# Patient Record
Sex: MALE | Race: WHITE | Employment: OTHER | URBAN - METROPOLITAN AREA
[De-identification: names, ages, dates, MRNs, and addresses within clinical notes are randomized per-mention and may not be internally consistent; named-entity substitution may affect disease eponyms.]

---

## 2019-04-16 ENCOUNTER — HOSPITAL ENCOUNTER (OUTPATIENT)
Dept: WOUND CARE | Age: 79
Discharge: HOME | End: 2019-04-16
Payer: MEDICARE

## 2019-04-16 VITALS
DIASTOLIC BLOOD PRESSURE: 60 MMHG | OXYGEN SATURATION: 99 % | TEMPERATURE: 98 F | HEIGHT: 68 IN | WEIGHT: 186 LBS | HEART RATE: 73 BPM | BODY MASS INDEX: 28.19 KG/M2 | RESPIRATION RATE: 16 BRPM | SYSTOLIC BLOOD PRESSURE: 98 MMHG

## 2019-04-16 PROCEDURE — 97597 DBRDMT OPN WND 1ST 20 CM/<: CPT

## 2019-04-16 PROCEDURE — 88305 TISSUE EXAM BY PATHOLOGIST: CPT

## 2019-04-16 PROCEDURE — 99214 OFFICE O/P EST MOD 30 MIN: CPT

## 2019-04-16 NOTE — WOUND CARE
04/16/19 1635 Wound Leg Lower Left;Medial  
Date First Assessed/Time First Assessed: 04/16/19 1633   POA: Yes  Wound Type: Venous  Location: Leg Lower  Orientation: Left;Medial  
Dressing Status  Clean, dry, and intact Dressing Type   
(mitchel, gauze) Non-Pressure Injury Full thickness (subcut/muscle) Wound Length (cm) 0.8 cm Wound Width (cm) 0.7 cm Wound Depth (cm) 0.8 Wound Surface area (cm^2) 0.56 cm^2 Condition of [facility] Condition of Edges Open Tissue Type Percent Maroon/Purple 10 % Tissue Type Percent Yellow 90 Drainage Amount  Scant Drainage Color Serosanguinous Wound Odor None Periwound Skin Condition Erythema, blanchable Cleansing and Cleansing Agents (foam wash, vashe and barrier wipes) Dressing Type Applied Unna boot 
(mesalt, gauze, exudry, mitchel) Wound Procedure Type Devitalized Tissues Procedure Time Out 4572 Consent Obtained  Yes Procedure Bleeding Minimal  
Procedure Hemostasis  Pressure Type of Tissue Removed  Devitalized Procedure Instrument  Curette Procedure Pain Scale Numeric 2/10 Debridement Procedure Performed by  
(Dr. Chao Chopra) Post-Procedure Length (cm) 0.8 cm Post-Procedure Width (cm) 0.7 cm Post-Procedure Depth (cm) 1.1 cm Post-Procedure Volume (cm^3) 0.62 cm^3 Post-Procedure Surface Area (cm^2) 0.56 cm^2 Assisted Physcian in Procedure  Yes Procedure Tolerated Well Post debridement picture on top

## 2019-04-23 ENCOUNTER — HOSPITAL ENCOUNTER (OUTPATIENT)
Dept: WOUND CARE | Age: 79
Discharge: HOME | End: 2019-04-23
Payer: MEDICARE

## 2019-04-23 VITALS
HEART RATE: 80 BPM | TEMPERATURE: 97.3 F | RESPIRATION RATE: 17 BRPM | DIASTOLIC BLOOD PRESSURE: 60 MMHG | SYSTOLIC BLOOD PRESSURE: 94 MMHG | OXYGEN SATURATION: 100 %

## 2019-04-23 PROCEDURE — 11042 DBRDMT SUBQ TIS 1ST 20SQCM/<: CPT

## 2019-04-23 NOTE — WOUND CARE
Wound Care Progress Note Subjective:  
Quinton Ordonez is a 77 y.o.  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 No concerns. Wound care going well. Offloading wound:  
Appetite:  
Wound associated pain: mild Diabetic:  
 
Smoker: 
 
ROS: no N/V, no T/chills; no local rash There have been no changes in patient's medical history in the interim. Objective:  
Physical Exam:  
 VSS, Afebrile General: well developed, well nourished, pleasant , NAD. Hygiene good Psych: cooperative. Pleasant. No anxiety or depression. Normal mood and affect. Neuro: alert and oriented to person/place/situation. Lower extremities: color normal; temperature normal. Hair growth is not present. Calves are supple, nontender, approximately equally sized in comparison. Focused Lower Extremity Exam 
Vascular exam: 
 
Left lower extremity: Pitting  +1  edema, foot ,  
DP pulse :0 
PT pulse: 0 Nails  
 
 lower extremity:   edema, foot ,  
DP pulse :  
PT pulse:   
Nails Calf Ulcer 1 Description:  
Etiology:Venous Measurement: 0.8 x 0.5 x 0.5 cm Ulcer bed: improved with increased granulation and reduced necrotic bed Depth of Wound: To Muscle Periwound: Indurated Exudate: Serosanguinous Assessment/Plan  
77 y.o. male with Venous  Calf ulcer,biposy reviewed showed fibrotic tissue with ulceration no other path found curettage debridement Improving and reduced in size Clean wound with Vashe flush after curette debridement performed to remove the fibrotic and necrotic base to good hemorraghic bed Wound dressing ordered Unna boot applied Periwound:  
 
Compression ordered:  
Prescriptions ordered: Pre Authorization for [employer] Labs ordered:  
Vascular studies:  
Tissue Culture and Sensitivity:  
Radiology test ordered:  
Consultations:  
 
Home Health ordered: How often: Once a week Patient understood and agrees with plan. Questions answered. Weekly visits and serial debridements also discussed. Follow up with me in 2 week. Signed By: Kandi Coffman DPM   
 April 23, 2019

## 2019-04-23 NOTE — WOUND CARE
04/23/19 7582 Wound Leg Lower Left;Medial  
Date First Assessed/Time First Assessed: 04/16/19 1633   POA: Yes  Wound Type: Venous  Location: Leg Lower  Orientation: Left;Medial  
Wound Length (cm) 0.8 cm Wound Width (cm) 0.5 cm Wound Depth (cm) 0.5 Wound Surface area (cm^2) 0.4 cm^2 Change in Wound Size % 28.57 Condition of Base Granulation Condition of Edges Open Tissue Type Percent Pink (10) Tissue Type Percent Red 90 Drainage Amount  Scant Drainage Color Serosanguinous Wound Odor None Periwound Skin Condition Erythema, blanchable Cleansing and Cleansing Agents  Other (comment) (Vashe wound cleanser.) Dressing Type Applied Unna boot; Other (Comment) Wound Procedure Type Devitalized Tissues Procedure Time Out 3884 Consent Obtained  Yes Procedure Bleeding Minimal  
Procedure Hemostasis  Pressure Type of Tissue Removed  Devitalized Procedure Instrument  Curette Debridement piccs

## 2019-04-23 NOTE — DISCHARGE INSTRUCTIONS
Physicians Order for Wound Care                                      Discharge Instructions                   These are the instructions to follow for care of your wound and any follow- up appointments. Please call the [address] if you have any questions or if you experience any of the following :        · Increase in pain                                     · Temperature over 101 degrees Fahrenheit    ·  Increase in drainage            · Drainage with a foul odor    ·  Bleeding    ·  Increase in Swelling    · Compression bandage changes ( slippage, breakthrough drainage)           [facility] business hours are 8:00 am - 4:30 pm. Please contact your primary care physician or proceed to the nearest health care facility if you experience any of the above concerns after business hours.      Offloading-   Edema Control-Elevate legs above level of heart Unna boots   Nutritional supplements-    Home Care- keep the bandages clean dry   Consult-   Home Care-   Apply to Wound-   Cover with-   Other Details- Other Details continue taking the antibiotc     Erin Ulcer care-   Dressing Change frequency-    Prescription(s) given   Follow up in 2 weeks with Dr. Domenico Quintero, Nurse Visit in 6 days for dressing change    Physician Name Dr. Graciela Oh _________________________ 4/23/2019    Yasir Cooney DPM

## 2019-04-29 ENCOUNTER — HOSPITAL ENCOUNTER (OUTPATIENT)
Dept: WOUND CARE | Age: 79
Discharge: HOME | End: 2019-04-29
Payer: MEDICARE

## 2019-04-29 VITALS
SYSTOLIC BLOOD PRESSURE: 97 MMHG | RESPIRATION RATE: 16 BRPM | HEART RATE: 84 BPM | OXYGEN SATURATION: 100 % | TEMPERATURE: 98 F | DIASTOLIC BLOOD PRESSURE: 62 MMHG

## 2019-04-29 PROCEDURE — 29580 STRAPPING UNNA BOOT: CPT

## 2019-04-29 NOTE — DISCHARGE INSTRUCTIONS
Leave dressings in place until next visit    Patient to return for wound care in: 7 Days    PLEASE CONTACT OFFICE AS SOON AS POSSIBLE IF UNABLE TO MAKE THIS APPOINTMENT. Inspect your wounds, looking for signs of infection which may include the following:  Increase in redness  Red \"streaks\" from wound  Increase in swelling Fever  Unusual odor Change in the amount of wound drainage. Should you experience any significant changes in your wound(s) or have any questions regarding your home care instructions please contact the wound center or your home health company. If after regular business hours, please call your family doctor or local emergency room. Edema Control: Elevate legs as much as possible. Avoid standing in one position for more than 10 minutes. Avoid setting with legs down. Do not cross legs while sitting. Off-Loading:Frequent position changes. Do not cross legs while sitting. Shift weight every 20 minutes or more when sitting for prolonged periods of time.

## 2019-04-29 NOTE — WOUND CARE
04/29/19 1043 Wound Leg Lower Left;Medial  
Date First Assessed/Time First Assessed: 04/16/19 1633   POA: Yes  Wound Type: Venous  Location: Leg Lower  Orientation: Left;Medial  
Dressing Status  Clean, dry, and intact Dressing Type  Unna boot 
(mitchel, exudry, 4x4, mepilex foam) Non-Pressure Injury Full thickness (subcut/muscle) Wound Length (cm) 0.5 cm Wound Width (cm) 0.3 cm Wound Depth (cm) 0.2 Wound Surface area (cm^2) 0.15 cm^2 Change in Wound Size % 73.21 Condition of Base Granulation;Slough Condition of Edges Open Tissue Type Percent Red 75 Tissue Type Percent Yellow 25 Drainage Amount  Small Drainage Color Serosanguinous Wound Odor None Periwound Skin Condition Macerated;Ecchymosis Cleansing and Cleansing Agents  Other (comment) (vashe) Dressing Type Applied Unna boot 
(mitchel, exudry, foam, aquacellag, carlos) Procedure Tolerated Well

## 2019-05-07 ENCOUNTER — HOSPITAL ENCOUNTER (OUTPATIENT)
Dept: WOUND CARE | Age: 79
Discharge: HOME | End: 2019-05-07
Payer: MEDICARE

## 2019-05-07 VITALS
RESPIRATION RATE: 18 BRPM | DIASTOLIC BLOOD PRESSURE: 66 MMHG | SYSTOLIC BLOOD PRESSURE: 96 MMHG | TEMPERATURE: 97.5 F | HEART RATE: 70 BPM | OXYGEN SATURATION: 100 %

## 2019-05-07 PROCEDURE — 99214 OFFICE O/P EST MOD 30 MIN: CPT

## 2019-05-07 NOTE — WOUND CARE
05/07/19 1110 Wound Leg Lower Left;Medial  
Date First Assessed/Time First Assessed: 04/16/19 1633   POA: Yes  Wound Type: Venous  Location: Leg Lower  Orientation: Left;Medial  
Dressing Status  Clean, dry, and intact Dressing Type  Unna boot 
(mitchel, exudry, mepilex foam, aquacel ag) Non-Pressure Injury Full thickness (subcut/muscle) Wound Length (cm) 0.8 cm Wound Width (cm) 0.4 cm Wound Depth (cm) 0.1 Wound Surface area (cm^2) 0.32 cm^2 Change in Wound Size % 42.86 Condition of Base Granulation;Slough Condition of Edges Open Tissue Type Percent Pink 90 Tissue Type Percent Yellow 10 Drainage Amount  Scant Drainage Color Serosanguinous Wound Odor None Periwound Skin Condition Intact Cleansing and Cleansing Agents (foam wash, vashe and barrier wipes) Dressing Type Applied (bacitracin, aquacel ag, mepilex border, tubigrip D) Procedure Tolerated Well

## 2019-05-07 NOTE — WOUND CARE
Wound Care Progress Note Subjective:  
Kiana Hess is a 77 y.o.  male for follow up of Left   Venous  ulcer to the Other part of lower leg To Fat Layer Ulcer has been present for/since Doing well. No concerns. Wound care going well. Offloading wound: yes Appetite:  
Wound associated pain: mild Diabetic:  
 
Smoker: 
 
ROS: no N/V, no T/chills; no local rash There have been no changes in patient's medical history in the interim. Objective:  
Physical Exam:  
 VSS, Afebrile General: well developed, well nourished, pleasant , NAD. Hygiene good Psych: cooperative. Pleasant. No anxiety or depression. Normal mood and affect. Neuro: alert and oriented to person/place/situation. Lower extremities: color normal; temperature normal. Hair growth is not present. Calves are supple, nontender, approximately equally sized in comparison. Focused Lower Extremity Exam 
Vascular exam: 
 
Left lower extremity: Pitting  +1  edema, foot ,  
DP pulse :0 
PT pulse: 0 Nails  
 
 lower extremity:   edema, foot ,  
DP pulse :  
PT pulse:   
Nails Other part of lower leg Ulcer 1 Description:  
Etiology:Venous Measurement: 0.8 x 0.4 x 0.1 cm Ulcer bed: Granular/Healthy Depth of Wound: To Fat Layer Periwound: Intact Exudate: Serosanguinous Assessment/Plan  
77 y.o. male with Venous  Other part of lower leg ulcer. improving Clean wound with foam wash, vashe Wound dressing ordered PetMD ag, bacitracin, mepilex, tubigrip, Periwound:  
 
Compression ordered: Compression Stockings 15-20 mmHg Prescriptions ordered:  
Labs ordered:  
Vascular studies:  
Tissue Culture and Sensitivity:  
Radiology test ordered:  
Consultations:  
 
Home Health ordered: yes How often:  
 
 
Patient understood and agrees with plan. Questions answered. Weekly visits and serial debridements also discussed. Follow up with me in 2 week. Signed By: Veronica Talamantes DPM   
 May 7, 2019

## 2019-05-14 ENCOUNTER — HOSPITAL ENCOUNTER (OUTPATIENT)
Dept: WOUND CARE | Age: 79
Discharge: HOME | End: 2019-05-14
Payer: MEDICARE

## 2019-05-14 VITALS
HEART RATE: 69 BPM | SYSTOLIC BLOOD PRESSURE: 90 MMHG | TEMPERATURE: 97.7 F | RESPIRATION RATE: 15 BRPM | DIASTOLIC BLOOD PRESSURE: 51 MMHG | OXYGEN SATURATION: 99 %

## 2019-05-14 PROCEDURE — 29580 STRAPPING UNNA BOOT: CPT

## 2019-05-21 ENCOUNTER — HOSPITAL ENCOUNTER (OUTPATIENT)
Dept: WOUND CARE | Age: 79
Discharge: HOME | End: 2019-05-21
Payer: MEDICARE

## 2019-05-21 VITALS
SYSTOLIC BLOOD PRESSURE: 90 MMHG | DIASTOLIC BLOOD PRESSURE: 52 MMHG | TEMPERATURE: 98.1 F | OXYGEN SATURATION: 96 % | HEART RATE: 65 BPM | RESPIRATION RATE: 17 BRPM

## 2019-05-21 PROCEDURE — 29580 STRAPPING UNNA BOOT: CPT

## 2019-05-21 NOTE — WOUND CARE
Ulcer Debridement Ulcer Number 1; Left Other part of lower leg To Fat Layer;  Venous Character of Ulcer: Improved Indication for Debridement: Abnormal wound edge Pre debridement measurements:  
Wound Length: 0.5 cm Wound Width :0.2 cm Wound Depth :0.1 Risks of procedure were discussed with patient. Consent has been signed. Anesthetic: None Level of Debridement: Epidermis Tissue and/or Material removed: Skin Type of Tissue: Non- Viable Pre-debridement severity: Fat Layer Exposed Post debridement severity: Fat Layer exposed Instrument(s) used: Curette Bleeding controlled with: Pressure Estimated blood loss: Minimal 
 
Post debridement measurements:  
Wound Length: 0.5 cm Wound Width :0.2 cm Wound Depth :0.1 Post procedural pain: 2 /10 Patient tolerated procedure well.

## 2019-05-23 NOTE — WOUND CARE
05/21/19 1145 Wound Leg Lower Left;Medial  
Date First Assessed/Time First Assessed: 04/16/19 1633   POA: Yes  Wound Type: Venous  Location: Leg Lower  Orientation: Left;Medial  
Dressing Status  Intact; Old drainage Dressing Type  Unna boot (mepitel one, exudry,mitchel, coban) Non-Pressure Injury Full thickness (subcut/muscle) Wound Length (cm) 0.5 cm Wound Width (cm) 0.2 cm Wound Depth (cm) 0.1 Wound Surface area (cm^2) 0.1 cm^2 Change in Wound Size % 82.14 Condition of Base Granulation;Slough Condition of Edges Open Tissue Type Percent Pink 90 Tissue Type Percent Red 10 Tissue Type Percent Yellow 10 Drainage Amount  Scant Drainage Color Serosanguinous Wound Odor None Periwound Skin Condition Intact Cleansing and Cleansing Agents  Dermal wound cleanser Dressing Type Applied Unna boot 
(ag foam, promogran, 4x4,s, mitchel, coban) Procedure Tolerated Well

## 2019-05-23 NOTE — DISCHARGE INSTRUCTIONS
For Home Care/Self Care       Keep dressing dry and intact when bathing               Leave dressings in place until next visit     Patient to return for wound care in:  1 week for nurse assessment and dressing change and 2 weeks to see Dr. Jones Clarke. Inspect your wounds, looking for signs of infection which may include the following:  Increase in redness  Red \"streaks\" from wound  Increase in swelling  Fever  Unusual odor  Change in the amount of wound drainage. Should you experience any significant changes in your wound(s) or have any questions regarding your home care instructions please contact the wound center or your home health company. If after regular business hours, please call your family doctor or local emergency room. Edema Control: Elevate legs as much as possible. Avoid standing in one position for more than 10 minutes. Avoid setting with legs down. Do not cross legs while sitting. Off-Loading:Frequent position changes. Do not cross legs while sitting. Shift weight every 20 minutes or more when sitting for prolonged periods of time.     05/21/19 4533

## 2019-05-28 ENCOUNTER — HOSPITAL ENCOUNTER (OUTPATIENT)
Dept: WOUND CARE | Age: 79
Discharge: HOME | End: 2019-05-28
Payer: MEDICARE

## 2019-05-28 VITALS
SYSTOLIC BLOOD PRESSURE: 88 MMHG | TEMPERATURE: 98.1 F | DIASTOLIC BLOOD PRESSURE: 50 MMHG | OXYGEN SATURATION: 96 % | HEART RATE: 85 BPM | RESPIRATION RATE: 18 BRPM

## 2019-05-28 PROCEDURE — 29580 STRAPPING UNNA BOOT: CPT

## 2019-05-28 NOTE — WOUND CARE
05/28/19 1524 Wound Leg Lower Left;Medial  
Date First Assessed/Time First Assessed: 04/16/19 1633   POA: Yes  Wound Type: Venous  Location: Leg Lower  Orientation: Left;Medial  
Dressing Status  Clean, dry, and intact Dressing Type  Absorptive; Unna boot Non-Pressure Injury Full thickness (subcut/muscle) Wound Length (cm) 0.4 cm Wound Width (cm) 0.2 cm Wound Depth (cm) 0.1 Wound Surface area (cm^2) 0.08 cm^2 Change in Wound Size % 85.71 Condition of Base Granulation;Slough Condition of Edges Open Tissue Type Percent Pink 90 Tissue Type Percent Yellow 10 Drainage Amount  Scant Wound Odor None Periwound Skin Condition Intact Cleansing and Cleansing Agents (foam wash, vashe and barrier wipes) Dressing Type Applied Unna boot 
(promogran,bacitracin,mepilex foam ag, gauze, mitchel) Procedure Tolerated Well Picture not taken

## 2019-06-04 ENCOUNTER — HOSPITAL ENCOUNTER (OUTPATIENT)
Dept: WOUND CARE | Age: 79
Discharge: HOME | End: 2019-06-04
Payer: MEDICARE

## 2019-06-04 VITALS
HEART RATE: 97 BPM | DIASTOLIC BLOOD PRESSURE: 59 MMHG | SYSTOLIC BLOOD PRESSURE: 103 MMHG | RESPIRATION RATE: 17 BRPM | TEMPERATURE: 98.1 F | OXYGEN SATURATION: 100 %

## 2019-06-04 PROCEDURE — 29580 STRAPPING UNNA BOOT: CPT

## 2019-06-04 NOTE — WOUND CARE
06/04/19 0956   Wound Leg Lower Left;Medial   Date First Assessed/Time First Assessed: 04/16/19 1633   POA: Yes  Wound Type: Venous  Location: Leg Lower  Orientation: Left;Medial   Dressing Status  Clean, dry, and intact   Dressing Type  Absorptive; Unna boot   Non-Pressure Injury Full thickness (subcut/muscle)   Wound Length (cm) 0.7 cm   Wound Width (cm) 0.4 cm   Wound Depth (cm) 0.1   Wound Surface area (cm^2) 0.28 cm^2   Change in Wound Size % 50   Condition of Base Granulation   Condition of Edges Open   Drainage Amount  Scant   Drainage Color Serosanguinous   Wound Odor None   Periwound Skin Condition Edematous; Intact   Cleansing and Cleansing Agents  Other (comment); Soap and water  (vashe)   Dressing Type Applied Other (Comment)  (Silvercel, 4x4, Prateek, Unna boot, coban)   Procedure Tolerated Well

## 2019-06-04 NOTE — WOUND CARE
Wound Care Progress Note    Subjective:   Avery Bauer is a [de-identified] y.o.  male for follow up of Left   Venous  ulcer to the Other part of lower leg To Fat Layer Ulcer has been present for/since    Doing well. No concerns. Wound care going well. Offloading wound: yes  Appetite:   Wound associated pain: minimal  Diabetic:     Smoker:    ROS: no N/V, no T/chills; no local rash  There have been no changes in patient's medical history in the interim. Objective:   Physical Exam:    VSS, Afebrile  General: well developed, well nourished, pleasant , NAD. Hygiene good  Psych: cooperative. Pleasant. No anxiety or depression. Normal mood and affect. Neuro: alert and oriented to person/place/situation. Lower extremities: color normal; temperature normal. Hair growth is not present. Calves are supple, nontender, approximately equally sized in comparison. Focused Lower Extremity Exam  Vascular exam:    Left lower extremity: Pitting  +1  edema, foot ,   DP pulse :n/a  PT pulse:   Nails      lower extremity:   edema, foot ,   DP pulse :   PT pulse:    Nails     Other part of lower leg Ulcer 1 Description:   Etiology:Venous   Measurement: 0.7 x 0.4 x 0.1 cm  Ulcer bed: Granular/Healthy   Depth of Wound: To Fat Layer   Periwound: Normal  Exudate: Serous         Assessment/Plan   [de-identified] y.o. male with Venous  Other part of lower leg ulcer. Improved significantly  Clean wound with Vashe  Wound dressing ordered Alginate , unnaboot  Periwound:     Compression ordered:   Prescriptions ordered:   Labs ordered:   Vascular studies:   Tissue Culture and Sensitivity:   Radiology test ordered:   Consultations:     Home Health ordered: How often:       Patient understood and agrees with plan. Questions answered. Weekly visits and serial debridements also discussed. Follow up with me in 1 week.       Signed By: Nestor Moore DPM     June 4, 2019

## 2019-06-11 ENCOUNTER — HOSPITAL ENCOUNTER (OUTPATIENT)
Dept: WOUND CARE | Age: 79
Discharge: HOME | End: 2019-06-11
Payer: MEDICARE

## 2019-06-11 VITALS
HEART RATE: 83 BPM | OXYGEN SATURATION: 98 % | DIASTOLIC BLOOD PRESSURE: 55 MMHG | TEMPERATURE: 98 F | SYSTOLIC BLOOD PRESSURE: 94 MMHG | RESPIRATION RATE: 16 BRPM

## 2019-06-11 PROCEDURE — 99214 OFFICE O/P EST MOD 30 MIN: CPT

## 2019-06-11 NOTE — WOUND CARE
Wound Care Progress Note Subjective:  
Fidelina Slater is a [de-identified] y.o.  male for follow up of Left   Venous  ulcer to the Other part of lower leg To Fat Layer Ulcer has been present for/since Doing well. No concerns. Wound care going well. Offloading wound: yes Appetite:  
Wound associated pain: mild Diabetic:  
 
Smoker: 
 
ROS: no N/V, no T/chills; no local rash There have been no changes in patient's medical history in the interim. Objective:  
Physical Exam:  
 VSS, Afebrile General: well developed, well nourished, pleasant , NAD. Hygiene good Psych: cooperative. Pleasant. No anxiety or depression. Normal mood and affect. Neuro: alert and oriented to person/place/situation. Lower extremities: color normal; temperature normal. Hair growth is not present. Calves are supple, nontender, approximately equally sized in comparison. Focused Lower Extremity Exam 
Vascular exam: 
 
Left lower extremity: Pitting  +1  edema, foot ,  
DP pulse : 
PT pulse:  
Nails  
 
 lower extremity:   edema, foot ,  
DP pulse :  
PT pulse:   
Nails Other part of lower leg Ulcer 1 Description:  
Etiology:Venous Measurement: 0.4 x 0.2 x 0.1 cm Ulcer bed: Granular/Healthy Depth of Wound: To Fat Layer Periwound: Normal 
Exudate: Serosanguinous Assessment/Plan  
[de-identified] y.o. male with Venous  Other part of lower leg ulcer. Minimal change Clean wound with Soap / Water Wound dressing ordered Prizma, bacitracin, mepilex border ag Periwound: GV 
 
Compression ordered:  
Prescriptions ordered:  
Labs ordered:  
Vascular studies:  
Tissue Culture and Sensitivity:  
Radiology test ordered:  
Consultations:  
 
Home Health ordered: How often:  
 
 
Patient understood and agrees with plan. Questions answered. Weekly visits and serial debridements also discussed. Follow up with me in 1 week. Signed By: Amita Rivers DPM   
 June 11, 2019

## 2019-06-11 NOTE — WOUND CARE
06/11/19 4299 Wound Leg Lower Left;Medial  
Date First Assessed/Time First Assessed: 04/16/19 1633   POA: Yes  Wound Type: Venous  Location: Leg Lower  Orientation: Left;Medial  
Dressing Status  Clean, dry, and intact Dressing Type  Absorptive; Unna boot Non-Pressure Injury Full thickness (subcut/muscle) Wound Length (cm) 0.4 cm Wound Width (cm) 0.2 cm Wound Depth (cm) 0.1 Wound Surface area (cm^2) 0.08 cm^2 Change in Wound Size % 85.71 Condition of Base Granulation Condition of Edges Open Tissue Type Percent Red 100 Drainage Amount  Scant Drainage Color Serosanguinous Wound Odor None Periwound Skin Condition Edematous; Intact Cleansing and Cleansing Agents (foam wash, vashe and barrier wipes) Dressing Type Applied  
(carlos, bacitracin, mepilex border ag, tubigrip E) Procedure Tolerated Well

## 2019-06-18 ENCOUNTER — HOSPITAL ENCOUNTER (OUTPATIENT)
Dept: WOUND CARE | Age: 79
Discharge: HOME | End: 2019-06-18
Payer: MEDICARE

## 2019-06-18 ENCOUNTER — HOSPITAL ENCOUNTER (OUTPATIENT)
Dept: WOUND CARE | Age: 79
End: 2019-06-18
Payer: MEDICARE

## 2019-06-18 PROCEDURE — 99214 OFFICE O/P EST MOD 30 MIN: CPT

## 2019-06-18 NOTE — WOUND CARE
Wound Care Progress Note Subjective:  
Agustin Pisano is a [de-identified] y.o.  male for follow up of Left   Venous  ulcer to the Other part of lower leg To Fat Layer Ulcer has been present for/since Doing well. No concerns. Wound care going well. Offloading wound: yes Appetite:  
Wound associated pain: 2 /10 Diabetic:  
 
Smoker: 
 
ROS: no N/V, no T/chills; no local rash There have been no changes in patient's medical history in the interim. Objective:  
Physical Exam:  
 VSS, Afebrile General: well developed, well nourished, pleasant , NAD. Hygiene good Psych: cooperative. Pleasant. No anxiety or depression. Normal mood and affect. Neuro: alert and oriented to person/place/situation. Lower extremities: color normal; temperature normal. Hair growth is not present. Calves are supple, nontender, approximately equally sized in comparison. Focused Lower Extremity Exam 
Vascular exam: 
 
Left lower extremity: Pitting  +1  edema, foot ,  
DP pulse :n/a 
PT pulse:  
Nails  
 
 lower extremity:   edema, foot ,  
DP pulse :  
PT pulse:   
Nails Other part of lower leg Ulcer 1 Description:  
Etiology:Venous Measurement: 0.2 x 0.2 x 0.1 cm Ulcer bed: Granular/Healthy Hypergranulation Depth of Wound: To Fat Layer Periwound: Normal 
Exudate: Serous Assessment/Plan  
[de-identified] y.o. male with Venous  Other part of lower leg ulcer. Hypergranulation small place in the base of the wound Clean wound with none Wound dressing ordered Silver Gel , unnaboot Periwound: Zinc Oxide Compression ordered:  
Prescriptions ordered:  
Labs ordered:  
Vascular studies:  
Tissue Culture and Sensitivity:  
Radiology test ordered:  
Consultations:  
 
Home Health ordered: How often:  
 
 
Patient understood and agrees with plan. Questions answered. Cauterized the wound hyper granulation with AgNo3 Weekly visits and serial debridements also discussed. Follow up with me in 1 week. Signed By: Heather Lester DPM   
 June 18, 2019

## 2019-06-19 VITALS
SYSTOLIC BLOOD PRESSURE: 133 MMHG | RESPIRATION RATE: 18 BRPM | DIASTOLIC BLOOD PRESSURE: 68 MMHG | TEMPERATURE: 98.5 F | OXYGEN SATURATION: 100 % | HEART RATE: 78 BPM

## 2019-06-19 NOTE — WOUND CARE
06/19/19 1010 Wound Leg Lower Left;Medial  
Date First Assessed/Time First Assessed: 04/16/19 1633   POA: Yes  Wound Type: Venous  Location: Leg Lower  Orientation: Left;Medial  
Dressing Status  Clean, dry, and intact Dressing Type  Foam  
Non-Pressure Injury Full thickness (subcut/muscle) Wound Length (cm) 0.2 cm Wound Width (cm) 0.2 cm Wound Depth (cm) 0.1 Wound Surface area (cm^2) 0.04 cm^2 Change in Wound Size % 92.86 Condition of Base Granulation Condition of Edges Open Drainage Amount  Scant Drainage Color Serous Wound Odor None Periwound Skin Condition Intact Cleansing and Cleansing Agents (foam wash, vashe) Dressing Type Applied  
(silvercel,mepilex border) Procedure Tolerated Well

## 2019-06-25 ENCOUNTER — HOSPITAL ENCOUNTER (OUTPATIENT)
Dept: WOUND CARE | Age: 79
Discharge: HOME | End: 2019-06-25
Payer: MEDICARE

## 2019-06-25 VITALS
DIASTOLIC BLOOD PRESSURE: 55 MMHG | RESPIRATION RATE: 18 BRPM | TEMPERATURE: 97.4 F | SYSTOLIC BLOOD PRESSURE: 90 MMHG | HEART RATE: 53 BPM | OXYGEN SATURATION: 99 %

## 2019-06-25 PROCEDURE — 99214 OFFICE O/P EST MOD 30 MIN: CPT

## 2019-06-25 NOTE — WOUND CARE
Wound Care Progress Note Subjective:  
Mali Arango is a [de-identified] y.o.  male for follow up of Left   Venous  ulcer to the Other part of lower leg To Fat Layer Ulcer with hypergranulation has been present for/since Doing well. No concerns. Wound care going well. Offloading wound: yes and n/a Appetite:  
Wound associated pain:  
Diabetic: no 
 
Smoker: 
 
ROS: no N/V, no T/chills; no local rash There have been no changes in patient's medical history in the interim. Objective:  
Physical Exam:  
 VSS, Afebrile General: well developed, well nourished, pleasant , NAD. Hygiene good Psych: cooperative. Pleasant. No anxiety or depression. Normal mood and affect. Neuro: alert and oriented to person/place/situation. Lower extremities: color normal; temperature normal. Hair growth is not present. Calves are supple, nontender, approximately equally sized in comparison. Focused Lower Extremity Exam 
Vascular exam: 
 
Left lower extremity: Pitting  +2  edema, foot ,  
DP pulse :n/a 
PT pulse:  
Nails  
 
 lower extremity:   edema, foot ,  
DP pulse :  
PT pulse:   
Nails Other part of lower leg Ulcer 1 Description:  
Etiology:Venous Measurement: 0.2 x 0.2 x 0.1 cm Ulcer bed: Hypergranulation Depth of Wound: To Fat Layer Periwound: Normal 
Exudate:  
 
 
 
Assessment/Plan  
[de-identified] y.o. male with Venous  Other part of lower leg ulcer. Still open Clean wound with Normal Saline Wound dressing ordered , AgNo3 used to cauterize second time Periwound:  
 
Compression ordered: Compression Stockings 15-20 mmHg Prescriptions ordered:  
Labs ordered:  
Vascular studies:  
Tissue Culture and Sensitivity:  
Radiology test ordered:  
Consultations:  
 
Home Health ordered: How often:  
 
 
Patient understood and agrees with plan. Questions answered. Weekly visits and serial debridements also discussed. Follow up with me in 2 week. Advised patient to keep the compression stockings on and keep the feet and legs elevated to reduce th chance of getting the wound bigger Will f/u when he is back from the vacation Signed By: Alberta Judge DPM   
 June 25, 2019

## 2019-06-25 NOTE — WOUND CARE
06/25/19 1129 Wound Leg Lower Left;Medial  
Date First Assessed/Time First Assessed: 04/16/19 1633   POA: Yes  Wound Type: Venous  Location: Leg Lower  Orientation: Left;Medial  
Dressing Status  Clean, dry, and intact Dressing Type  Absorptive 
(tubigrip size E) Non-Pressure Injury Full thickness (subcut/muscle) Wound Length (cm) 0.2 cm Wound Width (cm) 0.2 cm Wound Depth (cm) 0.1 Wound Surface area (cm^2) 0.04 cm^2 Change in Wound Size % 92.86 Condition of Base Granulation Condition of Edges Open Tissue Type Percent Red 100 Drainage Amount  Scant Drainage Color Serous Wound Odor None Periwound Skin Condition Edematous; Intact Cleansing and Cleansing Agents (vashe and barrier wipes) Dressing Type Applied (bacitracin, mepilex border, tubigrip size E) Procedure Tolerated Well

## 2019-06-25 NOTE — DISCHARGE INSTRUCTIONS
For Home Care/Self Care:     May change dressing as needed while on vacation.  Wear compression stockings and elevate leg whenever possible.          Patient to return for wound care in: 2 weeks to see Dr. Lynn Gregg

## 2019-07-16 ENCOUNTER — HOSPITAL ENCOUNTER (OUTPATIENT)
Dept: WOUND CARE | Age: 79
Discharge: HOME | End: 2019-07-16
Payer: MEDICARE

## 2019-07-16 DIAGNOSIS — I87.2 EDEMA OF BOTH LOWER LEGS DUE TO PERIPHERAL VENOUS INSUFFICIENCY: Primary | ICD-10-CM

## 2019-07-16 DIAGNOSIS — R60.0 EDEMA OF BOTH LOWER LEGS DUE TO PERIPHERAL VENOUS INSUFFICIENCY: Primary | ICD-10-CM

## 2019-07-16 PROCEDURE — 99214 OFFICE O/P EST MOD 30 MIN: CPT

## 2019-07-16 NOTE — WOUND CARE
Wound Care Progress Note Subjective:  
Kali Zambrano is a [de-identified] y.o.  male for follow up of Left   Venous  ulcer to the Other part of lower leg Breakdown of Skin  Ulcer has been present for several months almost closed with small hypergranulation still drains some Doing well. No concerns. Wound care going well. Offloading wound: yes Appetite: good Wound associated pain: none Diabetic:  
 
Smoker: 
 
ROS: no N/V, no T/chills; no local rash There have been no changes in patient's medical history in the interim. Objective:  
Physical Exam:  
 VSS, Afebrile General: well developed, well nourished, pleasant , NAD. Hygiene good Psych: cooperative. Pleasant. No anxiety or depression. Normal mood and affect. Neuro: alert and oriented to person/place/situation. Lower extremities: color normal; temperature normal. Hair growth is not present. Calves are supple, nontender, approximately equally sized in comparison. Focused Lower Extremity Exam 
Vascular exam: 
 
Left lower extremity: Pitting  +2  edema, foot ,  
DP pulse :0 
PT pulse:  
Nails  
 
 lower extremity:   edema, foot ,  
DP pulse :  
PT pulse:   
Nails Other part of lower leg Ulcer 1 Description:  
Etiology:Venous Measurement:  Small hypergranulation Ulcer bed: Hypergranulation Depth of Wound: Breakdown of Skin Periwound: Normal 
Exudate: Serous minimal 
 
 
 
Assessment/Plan  
[de-identified] y.o. male with Venous  Other part of lower leg ulcer. Almost healed Clean wound with Normal Saline Wound dressing ordered farrow wraps, Periwound:  
 
Compression ordered: patient has compression farrow wraps to wear Prescriptions ordered:  
Labs ordered:  
Vascular studies:  
Tissue Culture and Sensitivity:  
Radiology test ordered:  
Consultations: Vascular Dr. Heidi Gagnon for vein eval and treatment Home Health ordered: How often:  
 
 
Patient understood and agrees with plan. Questions answered. Weekly visits and serial debridements also discussed. Follow up with me as needed Patient is discharged from care. Addendum: 
Cauterized the Hypergranulation with [employer] Painting Signed By: Kaylyn Wall, APRIL   
 July 16, 2019

## 2019-07-16 NOTE — DISCHARGE INSTRUCTIONS
Patient has been discharge per Dr. Mellisa Thomas orders, and consulted [facility].

## 2019-07-16 NOTE — WOUND CARE
07/16/19 1018 Wound Leg Lower Left;Medial  
Date First Assessed/Time First Assessed: 04/16/19 1633   POA: Yes  Wound Type: Venous  Location: Leg Lower  Orientation: Left;Medial  
Dressing Status  Clean, dry, and intact Dressing Type  Absorptive Wound Length (cm) 0.2 cm Wound Width (cm) 0.2 cm Wound Depth (cm) 0.1 Wound Surface area (cm^2) 0.04 cm^2 Change in Wound Size % 92.86 Condition of Base Granulation Condition of Edges Closed Tissue Type Percent Red 100 Drainage Amount  Scant Drainage Color Sanguinous Wound Odor None Periwound Skin Condition Edematous; Intact Cleansing and Cleansing Agents  Dermal wound cleanser Dressing Type Applied Other (Comment) (mepilex border, patient has been discharge pre DR. Mellisa Thomas) Procedure Tolerated Well

## 2020-05-20 ENCOUNTER — ANESTHESIA EVENT (OUTPATIENT)
Dept: ENDOSCOPY | Age: 80
End: 2020-05-20
Payer: MEDICARE

## 2020-05-21 ENCOUNTER — ANESTHESIA (OUTPATIENT)
Dept: ENDOSCOPY | Age: 80
End: 2020-05-21
Payer: MEDICARE

## 2020-05-21 ENCOUNTER — HOSPITAL ENCOUNTER (OUTPATIENT)
Age: 80
Setting detail: OUTPATIENT SURGERY
Discharge: HOME | End: 2020-05-21
Attending: INTERNAL MEDICINE | Admitting: INTERNAL MEDICINE
Payer: MEDICARE

## 2020-05-21 VITALS
BODY MASS INDEX: 28.26 KG/M2 | DIASTOLIC BLOOD PRESSURE: 64 MMHG | HEIGHT: 69 IN | TEMPERATURE: 97 F | HEART RATE: 70 BPM | SYSTOLIC BLOOD PRESSURE: 98 MMHG | RESPIRATION RATE: 14 BRPM | OXYGEN SATURATION: 97 % | WEIGHT: 190.8 LBS

## 2020-05-21 PROCEDURE — 74011250637 HC RX REV CODE- 250/637: Performed by: NURSE ANESTHETIST, CERTIFIED REGISTERED

## 2020-05-21 PROCEDURE — 76040000019: Performed by: INTERNAL MEDICINE

## 2020-05-21 PROCEDURE — 88305 TISSUE EXAM BY PATHOLOGIST: CPT

## 2020-05-21 PROCEDURE — 77030008565 HC TBNG SUC IRR ERBE -B: Performed by: INTERNAL MEDICINE

## 2020-05-21 PROCEDURE — 74011250636 HC RX REV CODE- 250/636: Performed by: NURSE ANESTHETIST, CERTIFIED REGISTERED

## 2020-05-21 PROCEDURE — 77030019988 HC FCPS ENDOSC DISP BSC -B: Performed by: INTERNAL MEDICINE

## 2020-05-21 PROCEDURE — 74011000250 HC RX REV CODE- 250: Performed by: NURSE ANESTHETIST, CERTIFIED REGISTERED

## 2020-05-21 PROCEDURE — 77030018846 HC SOL IRR STRL H20 ICUM -A: Performed by: INTERNAL MEDICINE

## 2020-05-21 PROCEDURE — 74011250637 HC RX REV CODE- 250/637: Performed by: INTERNAL MEDICINE

## 2020-05-21 PROCEDURE — 76060000031 HC ANESTHESIA FIRST 0.5 HR: Performed by: INTERNAL MEDICINE

## 2020-05-21 PROCEDURE — 74011000258 HC RX REV CODE- 258: Performed by: NURSE ANESTHETIST, CERTIFIED REGISTERED

## 2020-05-21 RX ORDER — LIDOCAINE HYDROCHLORIDE 20 MG/ML
INJECTION, SOLUTION EPIDURAL; INFILTRATION; INTRACAUDAL; PERINEURAL AS NEEDED
Status: DISCONTINUED | OUTPATIENT
Start: 2020-05-21 | End: 2020-05-21 | Stop reason: HOSPADM

## 2020-05-21 RX ORDER — LIDOCAINE HYDROCHLORIDE 10 MG/ML
0.1 INJECTION, SOLUTION EPIDURAL; INFILTRATION; INTRACAUDAL; PERINEURAL AS NEEDED
Status: DISCONTINUED | OUTPATIENT
Start: 2020-05-21 | End: 2020-05-21 | Stop reason: HOSPADM

## 2020-05-21 RX ORDER — SODIUM CHLORIDE, SODIUM LACTATE, POTASSIUM CHLORIDE, CALCIUM CHLORIDE 600; 310; 30; 20 MG/100ML; MG/100ML; MG/100ML; MG/100ML
25 INJECTION, SOLUTION INTRAVENOUS CONTINUOUS
Status: DISCONTINUED | OUTPATIENT
Start: 2020-05-21 | End: 2020-05-21 | Stop reason: HOSPADM

## 2020-05-21 RX ORDER — SODIUM CHLORIDE 0.9 % (FLUSH) 0.9 %
5-40 SYRINGE (ML) INJECTION AS NEEDED
Status: DISCONTINUED | OUTPATIENT
Start: 2020-05-21 | End: 2020-05-21 | Stop reason: HOSPADM

## 2020-05-21 RX ORDER — SODIUM CHLORIDE 0.9 % (FLUSH) 0.9 %
5-40 SYRINGE (ML) INJECTION EVERY 8 HOURS
Status: DISCONTINUED | OUTPATIENT
Start: 2020-05-21 | End: 2020-05-21 | Stop reason: HOSPADM

## 2020-05-21 RX ORDER — DEXTROMETHORPHAN/PSEUDOEPHED 2.5-7.5/.8
DROPS ORAL AS NEEDED
Status: DISCONTINUED | OUTPATIENT
Start: 2020-05-21 | End: 2020-05-21 | Stop reason: HOSPADM

## 2020-05-21 RX ORDER — FAMOTIDINE 20 MG/1
20 TABLET, FILM COATED ORAL ONCE
Status: COMPLETED | OUTPATIENT
Start: 2020-05-21 | End: 2020-05-21

## 2020-05-21 RX ORDER — PROPOFOL 10 MG/ML
INJECTION, EMULSION INTRAVENOUS AS NEEDED
Status: DISCONTINUED | OUTPATIENT
Start: 2020-05-21 | End: 2020-05-21 | Stop reason: HOSPADM

## 2020-05-21 RX ORDER — SODIUM CHLORIDE, SODIUM LACTATE, POTASSIUM CHLORIDE, CALCIUM CHLORIDE 600; 310; 30; 20 MG/100ML; MG/100ML; MG/100ML; MG/100ML
50 INJECTION, SOLUTION INTRAVENOUS CONTINUOUS
Status: DISCONTINUED | OUTPATIENT
Start: 2020-05-21 | End: 2020-05-21 | Stop reason: HOSPADM

## 2020-05-21 RX ADMIN — PHENYLEPHRINE HYDROCHLORIDE 100 MCG: 10 INJECTION INTRAVENOUS at 14:34

## 2020-05-21 RX ADMIN — PROPOFOL 80 MG: 10 INJECTION, EMULSION INTRAVENOUS at 14:30

## 2020-05-21 RX ADMIN — SODIUM CHLORIDE, SODIUM LACTATE, POTASSIUM CHLORIDE, AND CALCIUM CHLORIDE 25 ML/HR: 600; 310; 30; 20 INJECTION, SOLUTION INTRAVENOUS at 11:41

## 2020-05-21 RX ADMIN — FAMOTIDINE 20 MG: 20 TABLET ORAL at 11:41

## 2020-05-21 RX ADMIN — LIDOCAINE HYDROCHLORIDE 40 MG: 20 INJECTION, SOLUTION EPIDURAL; INFILTRATION; INTRACAUDAL; PERINEURAL at 14:30

## 2020-05-21 NOTE — ANESTHESIA PREPROCEDURE EVALUATION
Relevant Problems   No relevant active problems       Anesthetic History   No history of anesthetic complications            Review of Systems / Medical History  Patient summary reviewed and pertinent labs reviewed    Pulmonary        Sleep apnea: CPAP           Neuro/Psych         TIA     Cardiovascular    Hypertension      CHF  Dysrhythmias : atrial fibrillation  Pacemaker and hyperlipidemia    Exercise tolerance: <4 METS  Comments: EF 50%   GI/Hepatic/Renal     GERD    Renal disease: CRI      Comments: Barrette's Endo/Other  Within defined limits           Other Findings              Physical Exam    Airway  Mallampati: II  TM Distance: > 6 cm  Neck ROM: normal range of motion   Mouth opening: Normal     Cardiovascular  Regular rate and rhythm,  S1 and S2 normal,  no murmur, click, rub, or gallop             Dental  No notable dental hx       Pulmonary  Breath sounds clear to auscultation               Abdominal  GI exam deferred       Other Findings            Anesthetic Plan    ASA: 4  Anesthesia type: MAC and general - backup          Induction: Intravenous  Anesthetic plan and risks discussed with: Patient

## 2020-05-21 NOTE — DISCHARGE INSTRUCTIONS
May resume Eliquis tomorrow     Brown's Esophagus: Care Instructions  Your Care Instructions    The esophagus is the tube that connects the throat to the stomach. Food and liquids go through this tube. In Brown's esophagus, the cells that line the tube change. This is usually because of gastroesophageal reflux disease (GERD). GERD causes acid from your stomach to back up into the esophagus. When you have Brown's esophagus, you are slightly more likely to get cancer of the esophagus. So regular testing is important to watch for signs of this cancer. You can treat GERD to control your symptoms and feel better. Follow-up care is a key part of your treatment and safety. Be sure to make and go to all appointments, and call your doctor if you are having problems. It's also a good idea to know your test results and keep a list of the medicines you take. How can you care for yourself at home? · Take your medicines exactly as prescribed. Call your doctor if you think you are having a problem with your medicine. · If you take over-the-counter medicine, such as antacids or acid reducers, follow all instructions on the label. If you use these medicines often, talk with your doctor. Be careful when you take over-the-counter antacid medicines. Many of these medicines have aspirin in them. Read the label to make sure that you are not taking more than the recommended dose. Too much aspirin can be harmful. · Do not smoke or chew tobacco. Smoking can make GERD worse. If you need help quitting, talk to your doctor about stop-smoking programs and medicines. These can increase your chances of quitting for good. · Chocolate, mint, and alcohol can make GERD worse. They relax the valve between the esophagus and the stomach. · Spicy foods, foods that have a lot of acid (like tomatoes and oranges), and coffee can make GERD symptoms worse in some people.  If your symptoms are worse after you eat a certain food, you may want to stop eating that food to see if your symptoms get better. · Eat smaller meals, and more often. After eating, wait 2 to 3 hours before you lie down. · Raise the head of your bed 6 to 8 inches by putting blocks under the frame or a foam wedge under the head of the mattress. · Do not wear tight clothing around your midsection. · If you are overweight, lose weight. Even losing 5 to 10 pounds can help. When should you call for help? Call your doctor now or seek immediate medical care if:    · You have new or worse belly pain.     · You are vomiting.    Watch closely for changes in your health, and be sure to contact your doctor if:    · You have any pain or difficulty swallowing.     · You are losing weight.     · You have new or worse symptoms, such as heartburn.     · You do not get better as expected. Where can you learn more? Go to http://tami-alexi.info/  Enter L146 in the search box to learn more about \"Brown's Esophagus: Care Instructions. \"  Current as of: August 11, 2019Content Version: 12.4  © 2101-3191 [employer]. Care instructions adapted under license by [employer] (which disclaims liability or warranty for this information). If you have questions about a medical condition or this instruction, always ask your healthcare professional. [address] any warranty or liability for your use of this information. Hiatal Hernia: Care Instructions  Your Care Instructions  A hiatal hernia occurs when part of the stomach bulges into the chest cavity. A hiatal hernia may allow stomach acid and juices to back up into the esophagus (acid reflux). This can cause a feeling of burning, warmth, heat, or pain behind the breastbone. This feeling may often occur after you eat, soon after you lie down, or when you bend forward, and it may come and go. You also may have a sour taste in your mouth.  These symptoms are commonly known as heartburn or reflux. But not all hiatal hernias cause symptoms. Follow-up care is a key part of your treatment and safety. Be sure to make and go to all appointments, and call your doctor if you are having problems. It's also a good idea to know your test results and keep a list of the medicines you take. How can you care for yourself at home? · Take your medicines exactly as prescribed. Call your doctor if you think you are having a problem with your medicine. · Do not take aspirin or other nonsteroidal anti-inflammatory drugs (NSAIDs), such as ibuprofen (Advil, Motrin) or naproxen (Aleve), unless your doctor says it is okay. Ask your doctor what you can take for pain. · Your doctor may recommend over-the-counter medicine. For mild or occasional indigestion, antacids such as Tums, Gaviscon, Maalox, or Mylanta may help. Your doctor also may recommend over-the-counter acid reducers, such as famotidine (Pepcid AC), cimetidine (Tagamet HB), ranitidine (Zantac 75 and Zantac 150), or omeprazole (Prilosec). Read and follow all instructions on the label. If you use these medicines often, talk with your doctor. · Change your eating habits. ? It's best to eat several small meals instead of two or three large meals. ? After you eat, wait 2 to 3 hours before you lie down. Late-night snacks aren't a good idea. ? Chocolate, mint, and alcohol can make heartburn worse. They relax the valve between the esophagus and the stomach. ? Spicy foods, foods that have a lot of acid (like tomatoes and oranges), and coffee can make heartburn symptoms worse in some people. If your symptoms are worse after you eat a certain food, you may want to stop eating that food to see if your symptoms get better. · Do not smoke or chew tobacco.  · If you get heartburn at night, raise the head of your bed 6 to 8 inches by putting the frame on blocks or placing a foam wedge under the head of your mattress.  (Adding extra pillows does not work.)  · Do not wear tight clothing around your middle. · Lose weight if you need to. Losing just 5 to 10 pounds can help. When should you call for help? Call your doctor now or seek immediate medical care if:    · You have new or worse belly pain.     · You are vomiting.    Watch closely for changes in your health, and be sure to contact your doctor if:    · You have new or worse symptoms of indigestion.     · You have trouble or pain swallowing.     · You are losing weight.     · You do not get better as expected. Where can you learn more? Go to http://tami-alexi.info/  Enter T074 in the search box to learn more about \"Hiatal Hernia: Care Instructions. \"  Current as of: August 11, 2019Content Version: 12.4  © 0668-5155 [employer]. Care instructions adapted under license by [employer] (which disclaims liability or warranty for this information). If you have questions about a medical condition or this instruction, always ask your healthcare professional. [place] [address] any warranty or liability for your use of this information. Upper GI Endoscopy: What to Expect at [address] [place] [address]  After you have an endoscopy, you will stay at the hospital or clinic for 1 to 2 hours. This will allow the medicine to wear off. You will be able to go home after your doctor or nurse checks to make sure you are not having any problems. You may have to stay overnight if you had treatment during the test. You may have a sore throat for a day or two after the test.  This care sheet gives you a general idea about what to expect after the test.  How can you care for yourself at home? Activity  · Rest as much as you need to after you go home.   · You should be able to go back to your usual activities the day after the test.  Diet  · Follow your doctor's directions for eating after the test.  · Drink plenty of fluids (unless your doctor has told you not to).  Medications  · If you have a sore throat the day after the test, use an over-the-counter spray to numb your throat. Follow-up care is a key part of your treatment and safety. Be sure to make and go to all appointments, and call your doctor if you are having problems. It's also a good idea to know your test results and keep a list of the medicines you take. When should you call for help? Call 911 anytime you think you may need emergency care. For example, call if:    · You passed out (loses consciousness).     · You have trouble breathing.     · You pass maroon or bloody stools.    Call your doctor now or seek immediate medical care if:    · You have pain that does not get better after your take pain medicine.     · You have new or worse belly pain.     · You have blood in your stools.     · You are sick to your stomach and cannot keep fluids down.     · You have a fever.     · You cannot pass stools or gas.    Watch closely for changes in your health, and be sure to contact your doctor if:    · Your throat still hurts after a day or two.     · You do not get better as expected. Where can you learn more? Go to http://tami-alexi.info/  Enter J454 in the search box to learn more about \"Upper GI Endoscopy: What to Expect at Home. \"  Current as of: August 11, 2019Content Version: 12.4  © 3228-1571 [employer]. Care instructions adapted under license by [employer] (which disclaims liability or warranty for this information). If you have questions about a medical condition or this instruction, always ask your healthcare professional. [address] any warranty or liability for your use of this information.       DISCHARGE SUMMARY from Nurse    PATIENT INSTRUCTIONS:    After general anesthesia or intravenous sedation, for 24 hours or while taking prescription Narcotics:  · Limit your activities  · Do not drive and operate hazardous machinery  · Do not make important personal or business decisions  · Do  not drink alcoholic beverages  · If you have not urinated within 8 hours after discharge, please contact your surgeon on call. Report the following to your surgeon:  · Excessive pain, swelling, redness or odor of or around the surgical area  · Temperature over 100.5  · Nausea and vomiting lasting longer than 4 hours or if unable to take medications  · Any signs of decreased circulation or nerve impairment to extremity: change in color, persistent  numbness, tingling, coldness or increase pain  · Any questions    What to do at Home:  Recommended activity: Activity as tolerated and no driving for today. *  Please give a list of your current medications to your Primary Care Provider. *  Please update this list whenever your medications are discontinued, doses are      changed, or new medications (including over-the-counter products) are added. *  Please carry medication information at all times in case of emergency situations. These are general instructions for a healthy lifestyle:    No smoking/ No tobacco products/ Avoid exposure to second hand smoke  Surgeon General's Warning:  Quitting smoking now greatly reduces serious risk to your health. Obesity, smoking, and sedentary lifestyle greatly increases your risk for illness    A healthy diet, regular physical exercise & weight monitoring are important for maintaining a healthy lifestyle    You may be retaining fluid if you have a history of heart failure or if you experience any of the following symptoms:  Weight gain of 3 pounds or more overnight or 5 pounds in a week, increased swelling in our hands or feet or shortness of breath while lying flat in bed. Please call your doctor as soon as you notice any of these symptoms; do not wait until your next office visit. The discharge information has been reviewed with the patient. The patient verbalized understanding.   Discharge medications reviewed with the patient and appropriate educational materials and side effects teaching were provided.   ___________________________________________________________________________________________________________________________________

## 2020-05-21 NOTE — ANESTHESIA POSTPROCEDURE EVALUATION
Procedure(s):  UPPER ENDOSCOPY with gastric and esophageal bx's. MAC, general - backup    Anesthesia Post Evaluation      Multimodal analgesia: multimodal analgesia used between 6 hours prior to anesthesia start to PACU discharge  Patient location during evaluation: bedside  Patient participation: complete - patient participated  Level of consciousness: awake  Pain score: 0  Pain management: adequate  Airway patency: patent  Anesthetic complications: no  Cardiovascular status: stable  Respiratory status: acceptable  Hydration status: acceptable  Post anesthesia nausea and vomiting:  controlled      Vitals Value Taken Time   BP 91/63 5/21/2020  3:06 PM   Temp 36.3 °C (97.4 °F) 5/21/2020  2:51 PM   Pulse 70 5/21/2020  3:14 PM   Resp 15 5/21/2020  3:14 PM   SpO2 100 % 5/21/2020  3:14 PM   Vitals shown include unvalidated device data.

## 2020-05-21 NOTE — H&P
[facility]   [email or web address]      Impression: 1. Hx of Brown's esophagus      Plan:     1. EGD and Bx      Chief Complaint:   Brown's esophagus    HPI:  Susan Sender is a 79 y.o. male who is being seen on consult for the above. SHANKAR well controlled. Hx of HH. Last EGD >2 yr ago for GI bleed on anticoagulants. Bxs NOT done at that time. Carmelita Rodriguez     PMH:   Past Medical History:   Diagnosis Date    Arrhythmia 2008    afib, medtonic pacemaker 2017    Brown's esophagus     Heart failure (HCC)     seen at [address] of breath     Sleep apnea     BiPap    Stroke [facility])        PSH:   Past Surgical History:   Procedure Laterality Date    CARDIAC SURG PROCEDURE UNLIST  2018    watchman procedure    HX CHOLECYSTECTOMY  2017    HX ORTHOPAEDIC  2015    LT FOOT SURGERY    HX OTHER SURGICAL  2018    watchman procedure-cardiac    HX PACEMAKER Left 2017       Social HX:   Social History     Socioeconomic History    Marital status:      Spouse name: Not on file    Number of children: Not on file    Years of education: Not on file    Highest education level: Not on file   Occupational History    Not on file   Social Needs    Financial resource strain: Not on file    Food insecurity     Worry: Not on file     Inability: Not on file    Transportation needs     Medical: Not on file     Non-medical: Not on file   Tobacco Use    Smoking status: Never Smoker    Smokeless tobacco: Never Used   Substance and Sexual Activity    Alcohol use: Yes     Comment: wine two times week    Drug use: Never    Sexual activity: Not on file   Lifestyle    Physical activity     Days per week: Not on file     Minutes per session: Not on file    Stress: Not on file   Relationships    Social connections     Talks on phone: Not on file     Gets together: Not on file     Attends Orthodox service: Not on file     Active member of club or organization: Not on file     Attends meetings of clubs or organizations: Not on file     Relationship status: Not on file    Intimate partner violence     Fear of current or ex partner: Not on file     Emotionally abused: Not on file     Physically abused: Not on file     Forced sexual activity: Not on file   Other Topics Concern    Not on file   Social History Narrative    Not on file       FHX:   History reviewed. No pertinent family history. Allergy:   Allergies   Allergen Reactions    Codeine Nausea Only       Home Medications:     Medications Prior to Admission   Medication Sig    carvediloL (COREG) 3.125 mg tablet Take 12.5 mg by mouth two (2) times daily (with meals).  atorvastatin (LIPITOR) 10 mg tablet Take 10 mg by mouth daily.  sacubitril-valsartan (ENTRESTO) 24 mg/26 mg tablet Take 1 Tab by mouth two (2) times a day.  furosemide (LASIX) 80 mg tablet Take 80 mg by mouth every other day. 80mg /160mg alternating every day    pantoprazole (PROTONIX) 40 mg tablet Take 40 mg by mouth daily.  Biotin 2,500 mcg cap Take 2,500 mcg by mouth daily.  calcium carbonate/vitamin D3 (CALCIUM 500 + D, D3, PO) Take 1 Tab by mouth daily.  magnesium oxide 500 mg tab Take 1 Tab by mouth daily.  docosahexanoic acid-eicosapent (FISH OIL) 120-180 mg cap Take 1,000 mg by mouth daily.  B-complex with vitamin C (VITAMIN B COMPLEX-C PO) Take 1 Tab by mouth daily.  SAW PALMETTO PO Take 1 Tab by mouth daily.  cholecalciferol (VITAMIN D3) 25 mcg (1,000 unit) cap Take  by mouth daily.  OTHER inatropium bromide nasal spray q d as needed    ascorbic acid, vitamin C, (VITAMIN C) 500 mg tablet Take 500 mg by mouth two (2) times a day.  apixaban (ELIQUIS PO) Take 5 mg by mouth daily. Takes 2.5mg twice a day       Review of Systems:     Constitutional: No fevers, chills, weight loss, fatigue. Skin: No rashes, pruritis, jaundice, ulcerations, erythema.    HENT: No headaches, nosebleeds, sinus pressure, rhinorrhea, sore throat. Eyes: No visual changes, blurred vision, eye pain, photophobia, jaundice. Cardiovascular: No chest pain, heart palpitations. Respiratory: No cough, SOB, wheezing, chest discomfort, orthopnea. Gastrointestinal: neg   Genitourinary: No dysuria, bleeding, discharge, pyuria. Musculoskeletal: No weakness, arthralgias, wasting. Endo: No sweats. Heme: No bruising, easy bleeding. Allergies: As noted. Neurological: Cranial nerves intact. Alert and oriented. Gait not assessed. Psychiatric:  No anxiety, depression, hallucinations. Visit Vitals  /65   Pulse 73   Temp 97.7 °F (36.5 °C)   Resp 20   Ht 5' 8.5\" (1.74 m)   Wt 86.5 kg (190 lb 12.8 oz)   SpO2 99%   BMI 28.59 kg/m²       Physical Assessment:     constitutional: appearance: well developed, well nourished, normal habitus, no deformities, in no acute distress. skin: inspection: no rashes, ulcers, icterus or other lesions; no clubbing or telangiectasias. palpation: no induration or subcutaneos nodules. eyes: inspection: normal conjunctivae and lids; no jaundice pupils: symmetrical, normoreactive to light, normal accommodation and size. ENMT: mouth: normal oral mucosa,lips and gums; good dentition. oropharynx: normal tongue, hard and soft palate; posterior pharynx without erithema, exudate or lesions. neck: thyroid: normal size, consistency and position; no masses or tenderness. respiratory: effort: normal chest excursion; no intercostal retraction or accessory muscle use. cardiovascular: abdominal aorta: normal size and position; no bruits. palpation: PMI of normal size and position; normal rhythm; no thrill or murmurs. abdominal: abdomen: normal consistency; no tenderness or masses. hernias: no hernias appreciated. liver: normal size and consistency. spleen: not palpable. rectal: hemoccult/guaiac: not performed.    musculoskeletal: digits and nails: no clubbing, cyanosis, petechiae or other inflammatory conditions. gait: normal gait and station head and neck: normal range of motion; no pain, crepitation or contracture. spine/ribs/pelvis: normal range of motion; no pain, deformity or contracture. lymphatic: axilae: not palpable. groin: not palpable. neck: within normal limits. other: not palpable. neurologic: cranial nerves: II-XII normal.   psychiatric: judgement/insight: within normal limits. memory: within normal limits for recent and remote events. mood and affect: no evidence of depression, anxiety or agitation. orientation: oriented to time, space and person. Basic Metabolic Profile   No results for input(s): NA, K, CL, CO2, BUN, GLU, CA, MG, PHOS in the last 72 hours. No lab exists for component: CREAT      CBC w/Diff    No results for input(s): WBC, RBC, HGB, HCT, MCV, MCH, MCHC, RDW, PLT, HGBEXT, HCTEXT, PLTEXT in the last 72 hours. No lab exists for component: MPV No results for input(s): GRANS, LYMPH, EOS, PRO, MYELO, METAS, BLAST in the last 72 hours. No lab exists for component: MONO, BASO     Hepatic Function   No results for input(s): ALB, TP, TBILI, GPT, SGOT, AP, AML, LPSE in the last 72 hours. No lab exists for component: Sancho Hayes MD, M.D. [facility], [address] [place] [address]  [email or web address]

## 2022-03-21 ENCOUNTER — HOSPITAL ENCOUNTER (OUTPATIENT)
Dept: LAB | Age: 82
Discharge: HOME | End: 2022-03-21
Payer: MEDICARE

## 2022-03-21 ENCOUNTER — TRANSCRIBE ORDER (OUTPATIENT)
Dept: REGISTRATION | Age: 82
End: 2022-03-21

## 2022-03-21 DIAGNOSIS — G60.3 IDIOPATHIC PROGRESSIVE POLYNEUROPATHY: ICD-10-CM

## 2022-03-21 DIAGNOSIS — G60.3 IDIOPATHIC PROGRESSIVE POLYNEUROPATHY: Primary | ICD-10-CM

## 2022-03-21 LAB
TSH SERPL DL<=0.05 MIU/L-ACNC: 1.52 UIU/ML (ref 0.36–3.74)
VIT B12 SERPL-MCNC: 1368 PG/ML (ref 211–911)

## 2022-03-21 PROCEDURE — 84443 ASSAY THYROID STIM HORMONE: CPT

## 2022-03-21 PROCEDURE — 86225 DNA ANTIBODY NATIVE: CPT

## 2022-03-21 PROCEDURE — 82784 ASSAY IGA/IGD/IGG/IGM EACH: CPT

## 2022-03-21 PROCEDURE — 82607 VITAMIN B-12: CPT

## 2022-03-21 PROCEDURE — 36415 COLL VENOUS BLD VENIPUNCTURE: CPT

## 2022-03-21 PROCEDURE — 82525 ASSAY OF COPPER: CPT

## 2022-03-22 LAB
CENTROMERE B AB SER-ACNC: <0.2 AI (ref 0–0.9)
CHROMATIN AB SERPL-ACNC: <0.2 AI (ref 0–0.9)
DSDNA AB SER-ACNC: 1 IU/ML (ref 0–9)
ENA JO1 AB SER-ACNC: <0.2 AI (ref 0–0.9)
ENA RNP AB SER-ACNC: 0.2 AI (ref 0–0.9)
ENA SCL70 AB SER-ACNC: <0.2 AI (ref 0–0.9)
ENA SM AB SER-ACNC: <0.2 AI (ref 0–0.9)
ENA SS-A AB SER-ACNC: <0.2 AI (ref 0–0.9)
ENA SS-B AB SER-ACNC: <0.2 AI (ref 0–0.9)
SEE BELOW, 164869: NORMAL

## 2022-03-24 LAB
COPPER SERPL-MCNC: 103 UG/DL (ref 69–132)
IGA SERPL-MCNC: 139 MG/DL (ref 61–437)
IGG SERPL-MCNC: 933 MG/DL (ref 603–1613)
IGM SERPL-MCNC: 39 MG/DL (ref 15–143)
PROT PATTERN SERPL IFE-IMP: NORMAL

## 2022-03-25 LAB
FAX TO INFO,FAXT: NORMAL
FAX TO NUMBER,FAXN: NORMAL

## 2022-09-27 RX ORDER — ZINC SULFATE 50(220)MG
60 CAPSULE ORAL DAILY
COMMUNITY

## 2022-09-27 RX ORDER — NIACIN 250 MG
250 TABLET ORAL DAILY
COMMUNITY

## 2022-09-27 RX ORDER — SPIRONOLACTONE 25 MG/1
25 TABLET ORAL DAILY
COMMUNITY
Start: 2022-03-23

## 2022-09-27 RX ORDER — METOPROLOL SUCCINATE 25 MG/1
12.5 TABLET, EXTENDED RELEASE ORAL DAILY
COMMUNITY
Start: 2021-12-09

## 2022-09-27 NOTE — PERIOP NOTES
PRE-SURGICAL INSTRUCTIONS        Patient's Name:  Miguel LARIOS Date:  9/27/2022            Covid Testing Date and Time:    Surgery Date:  10/3/2022                Do NOT eat or drink anything, including candy, gum, or ice chips after midnight on 10/3, unless you have specific instructions from your surgeon or anesthesia provider to do so. You may brush your teeth before coming to the hospital.  No smoking 24 hours prior to the day of surgery. No alcohol 24 hours prior to the day of surgery. No recreational drugs for one week prior to the day of surgery. Leave all valuables, including money/purse, at home. Remove all jewelry, nail polish, acrylic nails, and makeup (including mascara); no lotions powders, deodorant, or perfume/cologne/after shave on the skin. Follow instruction for Hibiclens washes and CHG wipes from surgeon's office. Glasses/contact lenses and dentures may be worn to the hospital.  They will be removed prior to surgery. Call your doctor if symptoms of a cold or illness develop within 24-48 hours prior to your surgery. 11.  If you are having an outpatient procedure, please make arrangements for a responsible ADULT TO [address] [place] [address] and stay with you for 24 hours after your surgery. 12. ONE VISITOR in the hospital at this time for outpatient procedures. Exceptions may be made for surgical admissions, per nursing unit guidelines      Special Instructions:      Bring list of CURRENT medications. Bring any pertinent legal medical records. Take these medications the morning of surgery with a sip of water:  per office    Follow physician instructions about stopping anticoagulants. Complete bowel prep per MD instructions. On the day of surgery, come in the main entrance of [facility]. Let the  at the desk know you are there for surgery. A staff member will come escort you to the surgical area on the second floor.     If you have any questions or concerns, please do not hesitate to call:     (Prior to the day of surgery) PAT department:  [phone]   (Day of surgery) Pre-Op department:  [phone]    These surgical instructions were reviewed with the patient during the PAT phone call.

## 2022-09-30 ENCOUNTER — ANESTHESIA EVENT (OUTPATIENT)
Dept: ENDOSCOPY | Age: 82
End: 2022-09-30
Payer: MEDICARE

## 2022-10-03 ENCOUNTER — ANESTHESIA (OUTPATIENT)
Dept: ENDOSCOPY | Age: 82
End: 2022-10-03
Payer: MEDICARE

## 2022-10-03 ENCOUNTER — HOSPITAL ENCOUNTER (OUTPATIENT)
Age: 82
Setting detail: OUTPATIENT SURGERY
Discharge: HOME | End: 2022-10-03
Attending: INTERNAL MEDICINE | Admitting: INTERNAL MEDICINE
Payer: MEDICARE

## 2022-10-03 VITALS
WEIGHT: 171.6 LBS | BODY MASS INDEX: 26.01 KG/M2 | TEMPERATURE: 97 F | SYSTOLIC BLOOD PRESSURE: 81 MMHG | HEART RATE: 70 BPM | HEIGHT: 68 IN | OXYGEN SATURATION: 97 % | DIASTOLIC BLOOD PRESSURE: 53 MMHG | RESPIRATION RATE: 16 BRPM

## 2022-10-03 LAB — GLUCOSE BLD STRIP.AUTO-MCNC: 104 MG/DL (ref 70–110)

## 2022-10-03 PROCEDURE — 74011250636 HC RX REV CODE- 250/636: Performed by: NURSE ANESTHETIST, CERTIFIED REGISTERED

## 2022-10-03 PROCEDURE — 2709999900 HC NON-CHARGEABLE SUPPLY: Performed by: INTERNAL MEDICINE

## 2022-10-03 PROCEDURE — 74011000250 HC RX REV CODE- 250: Performed by: NURSE ANESTHETIST, CERTIFIED REGISTERED

## 2022-10-03 PROCEDURE — 77030019988 HC FCPS ENDOSC DISP BSC -B: Performed by: INTERNAL MEDICINE

## 2022-10-03 PROCEDURE — 77030008565 HC TBNG SUC IRR ERBE -B: Performed by: INTERNAL MEDICINE

## 2022-10-03 PROCEDURE — 82962 GLUCOSE BLOOD TEST: CPT

## 2022-10-03 PROCEDURE — 99100 ANES PT EXTEME AGE<1 YR&>70: CPT | Performed by: ANESTHESIOLOGY

## 2022-10-03 PROCEDURE — 74011250637 HC RX REV CODE- 250/637: Performed by: INTERNAL MEDICINE

## 2022-10-03 PROCEDURE — 99100 ANES PT EXTEME AGE<1 YR&>70: CPT | Performed by: NURSE ANESTHETIST, CERTIFIED REGISTERED

## 2022-10-03 PROCEDURE — 00813 ANES UPR LWR GI NDSC PX: CPT | Performed by: NURSE ANESTHETIST, CERTIFIED REGISTERED

## 2022-10-03 PROCEDURE — 00813 ANES UPR LWR GI NDSC PX: CPT | Performed by: ANESTHESIOLOGY

## 2022-10-03 PROCEDURE — 88305 TISSUE EXAM BY PATHOLOGIST: CPT

## 2022-10-03 PROCEDURE — 76060000032 HC ANESTHESIA 0.5 TO 1 HR: Performed by: INTERNAL MEDICINE

## 2022-10-03 PROCEDURE — 76040000007: Performed by: INTERNAL MEDICINE

## 2022-10-03 RX ORDER — EPHEDRINE SULFATE/0.9% NACL/PF 50 MG/5 ML
SYRINGE (ML) INTRAVENOUS AS NEEDED
Status: DISCONTINUED | OUTPATIENT
Start: 2022-10-03 | End: 2022-10-03 | Stop reason: HOSPADM

## 2022-10-03 RX ORDER — FLUMAZENIL 0.1 MG/ML
0.2 INJECTION INTRAVENOUS
Status: DISCONTINUED | OUTPATIENT
Start: 2022-10-03 | End: 2022-10-03 | Stop reason: HOSPADM

## 2022-10-03 RX ORDER — SODIUM CHLORIDE, SODIUM LACTATE, POTASSIUM CHLORIDE, CALCIUM CHLORIDE 600; 310; 30; 20 MG/100ML; MG/100ML; MG/100ML; MG/100ML
50 INJECTION, SOLUTION INTRAVENOUS CONTINUOUS
Status: DISCONTINUED | OUTPATIENT
Start: 2022-10-03 | End: 2022-10-03 | Stop reason: HOSPADM

## 2022-10-03 RX ORDER — PROPOFOL 10 MG/ML
INJECTION, EMULSION INTRAVENOUS AS NEEDED
Status: DISCONTINUED | OUTPATIENT
Start: 2022-10-03 | End: 2022-10-03 | Stop reason: HOSPADM

## 2022-10-03 RX ORDER — EPINEPHRINE 0.1 MG/ML
1 INJECTION INTRACARDIAC; INTRAVENOUS
Status: DISCONTINUED | OUTPATIENT
Start: 2022-10-03 | End: 2022-10-03 | Stop reason: HOSPADM

## 2022-10-03 RX ORDER — SODIUM CHLORIDE 0.9 % (FLUSH) 0.9 %
5-40 SYRINGE (ML) INJECTION AS NEEDED
Status: CANCELLED | OUTPATIENT
Start: 2022-10-03

## 2022-10-03 RX ORDER — DEXTROMETHORPHAN/PSEUDOEPHED 2.5-7.5/.8
1.2 DROPS ORAL
Status: DISCONTINUED | OUTPATIENT
Start: 2022-10-03 | End: 2022-10-03 | Stop reason: HOSPADM

## 2022-10-03 RX ORDER — SODIUM CHLORIDE 0.9 % (FLUSH) 0.9 %
5-40 SYRINGE (ML) INJECTION EVERY 8 HOURS
Status: CANCELLED | OUTPATIENT
Start: 2022-10-03

## 2022-10-03 RX ORDER — DEXTROMETHORPHAN/PSEUDOEPHED 2.5-7.5/.8
DROPS ORAL AS NEEDED
Status: DISCONTINUED | OUTPATIENT
Start: 2022-10-03 | End: 2022-10-03 | Stop reason: HOSPADM

## 2022-10-03 RX ORDER — NALOXONE HYDROCHLORIDE 0.4 MG/ML
0.4 INJECTION, SOLUTION INTRAMUSCULAR; INTRAVENOUS; SUBCUTANEOUS
Status: DISCONTINUED | OUTPATIENT
Start: 2022-10-03 | End: 2022-10-03 | Stop reason: HOSPADM

## 2022-10-03 RX ORDER — LIDOCAINE HYDROCHLORIDE 10 MG/ML
0.1 INJECTION, SOLUTION EPIDURAL; INFILTRATION; INTRACAUDAL; PERINEURAL AS NEEDED
Status: DISCONTINUED | OUTPATIENT
Start: 2022-10-03 | End: 2022-10-03 | Stop reason: HOSPADM

## 2022-10-03 RX ORDER — ATROPINE SULFATE 0.1 MG/ML
0.5 INJECTION INTRAVENOUS
Status: DISCONTINUED | OUTPATIENT
Start: 2022-10-03 | End: 2022-10-03 | Stop reason: HOSPADM

## 2022-10-03 RX ADMIN — PROPOFOL 10 MG: 10 INJECTION, EMULSION INTRAVENOUS at 08:45

## 2022-10-03 RX ADMIN — PROPOFOL 10 MG: 10 INJECTION, EMULSION INTRAVENOUS at 08:50

## 2022-10-03 RX ADMIN — PROPOFOL 10 MG: 10 INJECTION, EMULSION INTRAVENOUS at 08:33

## 2022-10-03 RX ADMIN — Medication 10 MG: at 08:28

## 2022-10-03 RX ADMIN — Medication 10 MG: at 08:37

## 2022-10-03 RX ADMIN — FAMOTIDINE 20 MG: 10 INJECTION, SOLUTION INTRAVENOUS at 07:56

## 2022-10-03 RX ADMIN — SODIUM CHLORIDE, POTASSIUM CHLORIDE, SODIUM LACTATE AND CALCIUM CHLORIDE 50 ML/HR: 600; 310; 30; 20 INJECTION, SOLUTION INTRAVENOUS at 07:55

## 2022-10-03 RX ADMIN — PROPOFOL 30 MG: 10 INJECTION, EMULSION INTRAVENOUS at 08:26

## 2022-10-03 RX ADMIN — PROPOFOL 10 MG: 10 INJECTION, EMULSION INTRAVENOUS at 08:36

## 2022-10-03 RX ADMIN — PROPOFOL 20 MG: 10 INJECTION, EMULSION INTRAVENOUS at 08:30

## 2022-10-03 RX ADMIN — PROPOFOL 10 MG: 10 INJECTION, EMULSION INTRAVENOUS at 08:38

## 2022-10-03 NOTE — ANESTHESIA POSTPROCEDURE EVALUATION
Procedure(s):  COLONOSCOPY with bx's  UPPER ENDOSCOPY with bx's. MAC    Anesthesia Post Evaluation      Multimodal analgesia: multimodal analgesia used between 6 hours prior to anesthesia start to PACU discharge  Patient location during evaluation: PACU  Patient participation: complete - patient participated  Level of consciousness: awake and alert  Pain management: adequate  Airway patency: patent  Anesthetic complications: no  Cardiovascular status: acceptable  Respiratory status: acceptable  Hydration status: acceptable  Post anesthesia nausea and vomiting:  controlled  Final Post Anesthesia Temperature Assessment:  Normothermia (36.0-37.5 degrees C)      INITIAL Post-op Vital signs:   Vitals Value Taken Time   BP 78/49 10/03/22 1033   Temp 36.4 °C (97.5 °F) 10/03/22 0905   Pulse 72 10/03/22 1042   Resp 20 10/03/22 1042   SpO2 100 % 10/03/22 1042   Vitals shown include unvalidated device data.

## 2022-10-03 NOTE — DISCHARGE INSTRUCTIONS
Upper GI Endoscopy: What to Expect at [address] had an upper GI endoscopy. Your doctor used a thin, lighted tube that bends to look at the inside of your esophagus, your stomach, and the first part of the small intestine, called the duodenum. After you have an endoscopy, you will stay at the hospital or clinic for 1 to 2 hours. This will allow the medicine to wear off. You will be able to go home after your doctor or nurse checks to make sure that you're not having any problems. You may have to stay overnight if you had treatment during the test. You may have a sore throat for a day or two after the test.  This care sheet gives you a general idea about what to expect after the test.  How can you care for yourself at home? Activity   Rest as much as you need to after you go home. You should be able to go back to your usual activities the day after the test.  Diet   Follow your doctor's directions for eating after the test.  Drink plenty of fluids (unless your doctor has told you not to). Medications   If you have a sore throat the day after the test, use an over-the-counter spray to numb your throat. Follow-up care is a key part of your treatment and safety. Be sure to make and go to all appointments, and call your doctor if you are having problems. It's also a good idea to know your test results and keep a list of the medicines you take. When should you call for help? Call 911 anytime you think you may need emergency care. For example, call if:    You passed out (lost consciousness). You have trouble breathing. You pass maroon or bloody stools. Call your doctor now or seek immediate medical care if:    You have pain that does not get better after your take pain medicine. You have new or worse belly pain. You have blood in your stools. You are sick to your stomach and cannot keep fluids down. You have a fever. You cannot pass stools or gas.    Watch closely for changes in your health, and be sure to contact your doctor if:    Your throat still hurts after a day or two. You do not get better as expected. Where can you learn more? Go to http://www.grijalva.com/  Enter J454 in the search box to learn more about \"Upper GI Endoscopy: What to Expect at Home. \"  Current as of: September 8, 2021               Content Version: 13.2  © 2006-2022 [employer]. Care instructions adapted under license by [employer] (which disclaims liability or warranty for this information). If you have questions about a medical condition or this instruction, always ask your healthcare professional. [address] any warranty or liability for your use of this information. Learning About Gastric Polyps  What are gastric polyps? Gastric polyps are growths in the stomach. Most people who get them don't have any problems. The cause of most gastric polyps is not known. But some polyps grow in people who use stomach acid reducers called proton pump inhibitors (PPIs). People who have gastritis, ulcers, or an H. pylori infection in the stomach can sometimes get polyps. People who have a parent, brother, or sister with gastric polyps might have them too. Most gastric polyps aren't cancer. But a certain kind of polyp can turn into cancer. What are the symptoms? You may not know that you have gastric polyps. Most polyps don't lead to symptoms. Once in a while, larger polyps may cause bleeding, belly pain, or a blockage in the stomach. How are polyps diagnosed and treated? Most gastric polyps are found during an endoscopy (say \"[id number]-tan\") that is done for another health problem. Endoscopy is a test that uses a thin flexible tube to allow a doctor to look at the inside of your stomach.   Your doctor will treat your polyps based on what he or she sees during this test. If your doctor finds a polyp during the test, he or she may take it out. It will then be tested to make sure it isn't cancer. If your doctor finds H. pylori bacteria in your stomach, he or she will prescribe antibiotics. If you have been taking a PPI, the doctor may prescribe a different medicine instead. Sometimes the doctor may suggest another endoscopy to see how treatment is working. He or she may also suggest this to recheck certain kinds of polyps. The doctor may also suggest a colonoscopy to look for polyps in your colon. Follow-up care is a key part of your treatment and safety. Be sure to make and go to all appointments, and call your doctor if you are having problems. It's also a good idea to know your test results and keep a list of the medicines you take. Where can you learn more? Go to http://www.gray.com/  Enter Q409 in the search box to learn more about \"Learning About Gastric Polyps. \"  Current as of: September 8, 2021               Content Version: 13.2  © 2006-2022 [employer]. Care instructions adapted under license by [employer] (which disclaims liability or warranty for this information). If you have questions about a medical condition or this instruction, always ask your healthcare professional. [place] [address] any warranty or liability for your use of this information. Hiatal Hernia: Care Instructions  Your Care Instructions  A hiatal hernia occurs when part of the stomach bulges into the chest cavity. A hiatal hernia may allow stomach acid and juices to back up into the esophagus (acid reflux). This can cause a feeling of burning, warmth, heat, or pain behind the breastbone. This feeling may often occur after you eat, soon after you lie down, or when you bend forward, and it may come and go. You also may have a sour taste in your mouth. These symptoms are commonly known as heartburn or reflux. But not all hiatal hernias cause symptoms.   Follow-up care is a key part of your treatment and safety. Be sure to make and go to all appointments, and call your doctor if you are having problems. It's also a good idea to know your test results and keep a list of the medicines you take. How can you care for yourself at home? Take your medicines exactly as prescribed. Call your doctor if you think you are having a problem with your medicine. Do not take aspirin or other nonsteroidal anti-inflammatory drugs (NSAIDs), such as ibuprofen (Advil, Motrin) or naproxen (Aleve), unless your doctor says it is okay. Ask your doctor what you can take for pain. Your doctor may recommend over-the-counter medicine. For mild or occasional indigestion, antacids such as Tums, Gaviscon, Maalox, or Mylanta may help. Your doctor also may recommend over-the-counter acid reducers, such as famotidine (Pepcid AC), cimetidine (Tagamet HB), or omeprazole (Prilosec). Read and follow all instructions on the label. If you use these medicines often, talk with your doctor. Change your eating habits. It's best to eat several small meals instead of two or three large meals. After you eat, wait 2 to 3 hours before you lie down. Late-night snacks aren't a good idea. Avoid foods that make your symptoms worse. These may include chocolate, mint, alcohol, pepper, spicy foods, high-fat foods, or drinks with caffeine in them, such as tea, coffee, keli, or energy drinks. If your symptoms are worse after you eat a certain food, you may want to stop eating it to see if your symptoms get better. Do not smoke or chew tobacco.  If you get heartburn at night, raise the head of your bed 6 to 8 inches by putting the frame on blocks or placing a foam wedge under the head of your mattress. (Adding extra pillows does not work.)  Do not wear tight clothing around your middle. Lose weight if you need to. Losing just 5 to 10 pounds can help. When should you call for help?    Call your doctor now or seek immediate medical care if:    You have new or worse belly pain. You are vomiting. Watch closely for changes in your health, and be sure to contact your doctor if:    You have new or worse symptoms of indigestion. You have trouble or pain swallowing. You are losing weight. You do not get better as expected. Where can you learn more? Go to http://www.grijalva.com/  Enter T074 in the search box to learn more about \"Hiatal Hernia: Care Instructions. \"  Current as of: September 8, 2021               Content Version: 13.2  © 2006-2022 [employer]. Care instructions adapted under license by [employer] (which disclaims liability or warranty for this information). If you have questions about a medical condition or this instruction, always ask your healthcare professional. [address] any warranty or liability for your use of this information. Brown's Esophagus: Care Instructions  Your Care Instructions     The esophagus is the tube that connects the throat to the stomach. Food and liquids go through this tube. In Brown's esophagus, the cells that line the tube change. This is usually because of gastroesophageal reflux disease (GERD). GERD causes acid from your stomach to back up into the esophagus. When you have Brown's esophagus, you are slightly more likely to get cancer of the esophagus. So regular testing is important to watch for signs of this cancer. You can treat GERD to control your symptoms and feel better. Follow-up care is a key part of your treatment and safety. Be sure to make and go to all appointments, and call your doctor if you are having problems. It's also a good idea to know your test results and keep a list of the medicines you take. How can you care for yourself at home? Take your medicines exactly as prescribed. Call your doctor if you think you are having a problem with your medicine.   If you take over-the-counter medicine, such as antacids or acid reducers, follow all instructions on the label. If you use these medicines often, talk with your doctor. Be careful when you take over-the-counter antacid medicines. Many of these medicines have aspirin in them. Read the label to make sure that you are not taking more than the recommended dose. Too much aspirin can be harmful. Do not smoke or chew tobacco. Smoking can make GERD worse. If you need help quitting, talk to your doctor about stop-smoking programs and medicines. These can increase your chances of quitting for good. Avoid foods that make your symptoms worse. These may include chocolate, mint, alcohol, pepper, spicy foods, high-fat foods, or drinks with caffeine in them, such as tea, coffee, keli, or energy drinks. If your symptoms are worse after you eat a certain food, you may want to stop eating it to see if your symptoms get better. Eat smaller meals, and more often. After eating, wait 2 to 3 hours before you lie down. Raise the head of your bed 6 to 8 inches by putting blocks under the frame or a foam wedge under the head of the mattress. Do not wear tight clothing around your midsection. If you are overweight, lose weight. Even losing 5 to 10 pounds can help. When should you call for help? Call your doctor now or seek immediate medical care if:    You have new or worse belly pain. You are vomiting. Watch closely for changes in your health, and be sure to contact your doctor if:    You have any pain or difficulty swallowing. You are losing weight. You have new or worse symptoms, such as heartburn. You do not get better as expected. Where can you learn more? Go to http://www.gray.com/  Enter L146 in the search box to learn more about \"Brown's Esophagus: Care Instructions. \"  Current as of: September 8, 2021               Content Version: 13.2  © 1957-5444 [employer].    Care instructions adapted under license by its learning (which disclaims liability or warranty for this information). If you have questions about a medical condition or this instruction, always ask your healthcare professional. [address] any warranty or liability for your use of this information. Colonoscopy: What to Expect at [address]  After a colonoscopy, you'll stay at the clinic until you wake up. Then you can go home. But you'll need to arrange for a ride. Your doctor will tell you when you can eat and do your other usual activities. Your doctor will talk to you about when you'll need your next colonoscopy. Your doctor can help you decide how often you need to be checked. This will depend on the results of your test and your risk for colorectal cancer. After the test, you may be bloated or have gas pains. You may need to pass gas. If a biopsy was done or a polyp was removed, you may have streaks of blood in your stool (feces) for a few days. Problems such as heavy rectal bleeding may not occur until several weeks after the test. This isn't common. But it can happen after polyps are removed. This care sheet gives you a general idea about how long it will take for you to recover. But each person recovers at a different pace. Follow the steps below to get better as quickly as possible. How can you care for yourself at home? Activity    Rest when you feel tired. You can do your normal activities when it feels okay to do so. Diet    Follow your doctor's directions for eating. Unless your doctor has told you not to, drink plenty of fluids. This helps to replace the fluids that were lost during the colon prep. Do not drink alcohol. Medicines    Your doctor will tell you if and when you can restart your medicines. You will also be given instructions about taking any new medicines.      If you take aspirin or some other blood thinner, ask your doctor if and when to start taking it again. Make sure that you understand exactly what your doctor wants you to do. If polyps were removed or a biopsy was done during the test, your doctor may tell you not to take aspirin or other anti-inflammatory medicines for a few days. These include ibuprofen (Advil, Motrin) and naproxen (Aleve). Other instructions    For your safety, do not drive or operate machinery until the medicine wears off and you can think clearly. Your doctor may tell you not to drive or operate machinery until the day after your test.     Do not sign legal documents or make major decisions until the medicine wears off and you can think clearly. The anesthesia can make it hard for you to fully understand what you are agreeing to. Follow-up care is a key part of your treatment and safety. Be sure to make and go to all appointments, and call your doctor if you are having problems. It's also a good idea to know your test results and keep a list of the medicines you take. When should you call for help? Call 911 anytime you think you may need emergency care. For example, call if:    You passed out (lost consciousness). You pass maroon or bloody stools. You have trouble breathing. Call your doctor now or seek immediate medical care if:    You have pain that does not get better after you take pain medicine. You are sick to your stomach or cannot drink fluids. You have new or worse belly pain. You have blood in your stools. You have a fever. You cannot pass stools or gas. Watch closely for changes in your health, and be sure to contact your doctor if you have any problems. Where can you learn more? Go to http://www.gray.com/  Enter E264 in the search box to learn more about \"Colonoscopy: What to Expect at Home. \"  Current as of: September 8, 2021               Content Version: 13.2  © 1856-6652 [employer]wise[employer] Inc[employer].    Care instructions adapted under license by [employer] (which disclaims liability or warranty for this information). If you have questions about a medical condition or this instruction, always ask your healthcare professional. [address] any warranty or liability for your use of this information. Hemorrhoids: Care Instructions  Overview     Hemorrhoids are swollen veins that develop in the anal canal. Bleeding during bowel movements, itching, and rectal pain are the most common symptoms. Hemorrhoids can be uncomfortable at times, but rarely are they a serious problem. Most of the time, you can treat them with simple changes to your diet and bowel habits. These changes include eating more fiber and not straining to pass stools. Most hemorrhoids don't need surgery or other treatment unless they are very large and painful or bleed a lot. Follow-up care is a key part of your treatment and safety. Be sure to make and go to all appointments, and call your doctor if you are having problems. It's also a good idea to know your test results and keep a list of the medicines you take. How can you care for yourself at home? Sit in a few inches of warm water (sitz bath) 3 times a day and after bowel movements. The warm water helps with pain and itching. Put ice on your anal area several times a day for 10 minutes at a time. Put a thin cloth between the ice and your skin. Follow this by placing a warm, wet towel on the area for another 10 to 20 minutes. Take pain medicines exactly as directed. If the doctor gave you a prescription medicine for pain, take it as prescribed. If you are not taking a prescription pain medicine, ask your doctor if you can take an over-the-counter medicine. Keep the anal area clean, but be gentle. Use water and a fragrance-free soap, or use baby wipes or medicated pads such as Tucks. Wear cotton underwear and loose clothing to decrease moisture in the anal area. Eat more fiber.  Include foods such as whole-grain breads and cereals, raw vegetables, raw and dried fruits, and beans. Drink plenty of fluids. If you have kidney, heart, or liver disease and have to limit fluids, talk with your doctor before you increase the amount of fluids you drink. Use a stool softener that contains bran or psyllium. You can save money by buying bran or psyllium (available in bulk at most health food stores) and sprinkling it on foods or stirring it into fruit juice. Or you can use a product such as Metamucil or Hydrocil. Practice healthy bowel habits. Go to the bathroom as soon as you have the urge. Avoid straining to pass stools. Relax and give yourself time to let things happen naturally. Do not hold your breath while passing stools. Do not read while sitting on the toilet. Get off the toilet as soon as you have finished. Take your medicines exactly as prescribed. Call your doctor if you think you are having a problem with your medicine. When should you call for help? Call 911 anytime you think you may need emergency care. For example, call if:    You pass maroon or very bloody stools. Call your doctor now or seek immediate medical care if:    You have increased pain. You have increased bleeding. Watch closely for changes in your health, and be sure to contact your doctor if:    Your symptoms have not improved after 3 or 4 days. Where can you learn more? Go to http://www.grijalva.com/  Enter F228 in the search box to learn more about \"Hemorrhoids: Care Instructions. \"  Current as of: September 8, 2021               Content Version: 13.2  © 2006-2022 [employer]. Care instructions adapted under license by [employer] (which disclaims liability or warranty for this information).  If you have questions about a medical condition or this instruction, always ask your healthcare professional. [address] any warranty or liability for your use of this information. Diverticulosis: Care Instructions  Your Care Instructions  In diverticulosis, pouches called diverticula form in the wall of the large intestine (colon). The pouches do not cause any pain or other symptoms. Most people who have diverticulosis do not know they have it. But the pouches sometimes bleed, and if they become infected, they can cause pain and other symptoms. When this happens, it is called diverticulitis. Diverticula form when pressure pushes the wall of the colon outward at certain weak points. A diet that is too low in fiber can cause diverticula. Follow-up care is a key part of your treatment and safety. Be sure to make and go to all appointments, and call your doctor if you are having problems. It's also a good idea to know your test results and keep a list of the medicines you take. How can you care for yourself at home? Include fruits, leafy green vegetables, beans, and whole grains in your diet each day. These foods are high in fiber. Take a fiber supplement, such as Citrucel or Metamucil, every day if needed. Read and follow all instructions on the label. Drink plenty of fluids. If you have kidney, heart, or liver disease and have to limit fluids, talk with your doctor before you increase the amount of fluids you drink. Get at least 30 minutes of exercise on most days of the week. Walking is a good choice. You also may want to do other activities, such as running, swimming, cycling, or playing tennis or team sports. Cut out foods that cause gas, pain, or other symptoms. When should you call for help? Call your doctor now or seek immediate medical care if:    You have belly pain. You pass maroon or very bloody stools. You have a fever. You have nausea and vomiting. You have unusual changes in your bowel movements or abdominal swelling. You have burning pain when you urinate. You have abnormal vaginal discharge. You have shoulder pain. You have cramping pain that does not get better when you have a bowel movement or pass gas. You pass gas or stool from your urethra while urinating. Watch closely for changes in your health, and be sure to contact your doctor if you have any problems. Where can you learn more? Go to http://www.gray.com/  Enter [id number] in the search box to learn more about \"Diverticulosis: Care Instructions. \"  Current as of: September 8, 2021               Content Version: 13.2  © 2006-2022 [employer]. Care instructions adapted under license by [employer] (which disclaims liability or warranty for this information). If you have questions about a medical condition or this instruction, always ask your healthcare professional. [address] any warranty or liability for your use of this information. DISCHARGE SUMMARY from Nurse     POST-PROCEDURE INSTRUCTIONS:    Call your Physician if you:  Observe any excess bleeding. Develop a temperature over 100.5o F. Experience abdominal, shoulder or chest pain. Notice any signs of decreased circulation or nerve impairment to an extremity such as a change in color, persistent numbness, tingling, coldness or increase in pain. Vomit blood or you have nausea and vomiting lasting longer than 4 hours. Are unable to take medications. Are unable to urinate within 8 hours after discharge following general anesthesia or intravenous sedation. For the next 24 hours after receiving general anesthesia or intravenous sedation, or while taking prescription Narcotics, limit your activities:  Do NOT drive a motor vehicle, operate hazard machinery or power tools, or perform tasks that require coordination. The medication you received during your procedure may have some effect on your mental awareness. Do NOT make important personal or business decisions.   The medication you received during your procedure may have some effect on your mental awareness. Do NOT drink alcoholic beverages. These drinks do not mix well with the medications that have been given to you. Upon discharge from the hospital, you must be accompanied by a responsible adult. Resume your diet as directed by your physician. Resume medications as your physician has prescribed. Please give a list of your current medications to your Primary Care Provider. Please update this list whenever your medications are discontinued, doses are changed, or new medications (including over-the-counter products) are added. Please carry medication information at all times in case of emergency situations. These are general instructions for a healthy lifestyle:    No smoking/ No tobacco products/ Avoid exposure to second hand smoke. Surgeon General's Warning:  Quitting smoking now greatly reduces serious risk to your health. Obesity, smoking, and a sedentary lifestyle greatly increase your risk for illness. A healthy diet, regular physical exercise & weight monitoring are important for maintaining a healthy lifestyle  You may be retaining fluid if you have a history of heart failure or if you experience any of the following symptoms:  Weight gain of 3 pounds or more overnight or 5 pounds in a week, increased swelling in our hands or feet or shortness of breath while lying flat in bed. Please call your doctor as soon as you notice any of these symptoms; do not wait until your next office visit. Recognize signs and symptoms of STROKE:  F  -  Face looks uneven  A  -  Arms unable to move or move unevenly  S  -  Speech slurred or non-existent  T  -  Time to call 911 - as soon as signs and symptoms begin - DO NOT go back to bed or wait to see If you get better - TIME IS BRAIN. Colorectal Screening  Colorectal cancer almost always develops from precancerous polyps (abnormal growths) in the colon or rectum.   Screening tests can find precancerous polyps, so that they can be removed before they turn into cancer. Screening tests can also find colorectal cancer early, when treatment works best.  Speak with your physician about when you should begin screening and how often you should be tested. [employer] Activation    Thank you for requesting access to [employer]. Please follow the instructions below to securely access and download your online medical record. [employer] allows you to send messages to your doctor, view your test results, renew your prescriptions, schedule appointments, and more. How Do I Sign Up? In your internet browser, go to [email or web address]. Click on the First Time User? Click Here link in the Sign In box. You will see the New Member Sign Up page. Enter your [employer] Access Code exactly as it appears below. You will not need to use this code after youve completed the sign-up process. If you do not sign up before the expiration date, you must request a new code. [employer] Access Code: Activation code not generated  Current [facility] Status: Active (This is the date your [employer] access code will )    Enter the last four digits of your Social Security Number (xxxx) and Date of Birth (mm/dd/yyyy) as indicated and click Submit. You will be taken to the next sign-up page. Create a [employer] ID. This will be your [employer] login ID and cannot be changed, so think of one that is secure and easy to remember. Create a [employer] password. You can change your password at any time. Enter your Password Reset Question and Answer. This can be used at a later time if you forget your password. Enter your e-mail address. You will receive e-mail notification when new information is available in [address] Click Sign Up. You can now view and download portions of your medical record. Click the [place] link to download a portable copy of your medical information.     Additional Information    If you have questions, please call [phone]. Remember, MyChart is NOT to be used for urgent needs. For medical emergencies, dial 911. Educational references and/or instructions provided during this visit included:    See Attached      APPOINTMENTS:    Per MD Instruction    Discharge information has been reviewed with the patient. The patient verbalized understanding.

## 2022-10-03 NOTE — H&P
[email or web address]  [phone]    GASTROENTEROLOGY Pre-Procedure H and P      Impression/Plan:   1. This patient is consented for an EGD and colonoscopy for h/o Brown's esophagus, change in bowel habits, alternating constipation and diarrhea, fecal incontinence. Chief Complaint: h/o Brown's esophagus, change in bowel habits, alternating constipation and diarrhea, fecal incontinence. HPI:  Nirav Land is a 79 y.o. male who presents for an EGD and colonoscopy for evaluation of h/o Brown's esophagus, change in bowel habits, alternating constipation and diarrhea, fecal incontinence.       PMH:   Past Medical History:   Diagnosis Date    Arrhythmia 2008    afib, medtonic pacemaker 2017    Brown's esophagus     Chronic kidney disease     stg 3    Heart failure ([facility] [address]     [place] [address] [place] [address]     Shortness of breath     Sleep apnea     BiPap    Stroke ([facility] [address]     TIA-no residuals       PSH:   Past Surgical History:   Procedure Laterality Date    HX CHOLECYSTECTOMY  2017    HX ORTHOPAEDIC  2015    LT FOOT SURGERY    HX OTHER SURGICAL  2018    watchman procedure-cardiac    HX PACEMAKER Left 2017    VT CARDIAC SURG PROCEDURE UNLIST  2018    watchman procedure       Social HX:   Social History     Socioeconomic History    Marital status:      Spouse name: Not on file    Number of children: Not on file    Years of education: Not on file    Highest education level: Not on file   Occupational History    Not on file   Tobacco Use    Smoking status: Never    Smokeless tobacco: Never   Substance and Sexual Activity    Alcohol use: Yes     Comment: wine two times week    Drug use: Never    Sexual activity: Not on file   Other Topics Concern    Not on file   Social History Narrative    Not on file     Social Determinants of Health     Financial Resource Strain: Not on file   Food Insecurity: Not on file   Transportation Needs: Not on file   Physical Activity: Not on file   Stress: Not on file   Social Connections: Not on file   Intimate Partner Violence: Not on file   Housing Stability: Not on file       FHX:   History reviewed. No pertinent family history. Allergy:   Allergies   Allergen Reactions    Codeine Nausea Only       Home Medications:     Medications Prior to Admission   Medication Sig    metoprolol succinate (TOPROL-XL) 25 mg XL tablet Take 12.5 mg by mouth daily. spironolactone (ALDACTONE) 25 mg tablet Take 25 mg by mouth daily. empagliflozin (JARDIANCE) 10 mg tablet Take  by mouth daily. atorvastatin (LIPITOR) 10 mg tablet Take 10 mg by mouth daily. sacubitril-valsartan (ENTRESTO) 24 mg/26 mg tablet Take 1 Tab by mouth two (2) times a day. furosemide (LASIX) 80 mg tablet Take 80 mg by mouth daily. 60mg in am, 40mg at noon    pantoprazole (PROTONIX) 40 mg tablet Take 40 mg by mouth daily. Biotin 2,500 mcg cap Take 2,500 mcg by mouth daily. calcium carbonate/vitamin D3 (CALCIUM 500 + D, D3, PO) Take 1 Tab by mouth daily. magnesium oxide 500 mg tab Take 1 Tab by mouth daily. docosahexanoic acid-eicosapent 120-180 mg cap Take 1,000 mg by mouth daily. B-complex with vitamin C (VITAMIN B COMPLEX-C PO) Take 1 Tab by mouth daily. SAW PALMETTO PO Take 1 Tab by mouth daily. apixaban (ELIQUIS PO) Take 5 mg by mouth two (2) times a day. ascorbic acid, vitamin C, (VITAMIN C) 500 mg tablet Take 500 mg by mouth two (2) times a day. nicotinic acid (NIACIN) 250 mg tablet Take 250 mg by mouth daily. zinc sulfate (ZINCATE) 50 mg zinc (220 mg) capsule Take 60 mg by mouth daily. OTHER inatropium bromide nasal spray q d as needed       Review of Systems:     A complete 10 point review of systems was performed and pertinents are as per the HPI. Remainder of the review of systems was negative.        Visit Vitals  BP (!) 105/55   Pulse 68   Temp 97.7 °F (36.5 °C)   Resp 20   Ht 5' 8\" (1.727 m)   Wt 77.8 kg (171 lb 9.6 oz)   SpO2 99%   BMI 26.09 kg/m² Physical Assessment:     General: alert, cooperative, no acute distress, appears stated age. HEENT: normocephalic, no scleral icterus, moist mucous membranes, EOMs intact, no neck masses noted. Respiratory: lungs clear to auscultation bilaterally. Cardiovascular: regular rate and rhythm, no murmurs, rubs or gallops. Abdomen: normal bowel sounds, soft, non-tender to palpation. Extremities: no lower extremity edema, no cyanosis or clubbing. Neuro: alert and oriented x 3; non-focal exam.  Skin: no rashes. Psych: normal mood and affect. Ira Stephens MD  [facility]  [email or web address]  Phone: [phone]  Pager: [phone]  [email or web address]

## 2022-10-03 NOTE — PERIOP NOTES
Patients BP is 81/53. Patient denies complaint. Dr. Arnel Ureña notified and instructed to discharge the patient. RN verbalized understanding.

## 2022-10-03 NOTE — PROCEDURES
[email or web address] [address]  [place], [address]      Brief Procedure Note    Damari   1940  [id number]    Date of Procedure: 10/3/2022    Preoperative diagnosis: Diamond's esophagus without dysplasia [K22.70]  Change in bowel habits [R19.4]  Alternating constipation and diarrhea [R19.8]  Change in stool caliber [R19.5]  Incontinence of feces, unspecified fecal incontinence type [R15.9]    Postoperative diagnosis: ENDO: Diamond's esophagus, Large hiatal hernia, benign gastric polyps, esophagus bx's for diamond's esophagus surveillance (34cm & 32cm)  COLO: Diverticulosis, random colon bx's e/o microscopic colitis, hemorrhoids     Type of Anesthesia: MAC (Monitored anesthesia care)    Description of findings: same as post op dx    Procedure: Procedure(s):  COLONOSCOPY with bx's  UPPER ENDOSCOPY with bx's    :  Dr. Sarah Giron MD    Assistant(s): Endoscopy Technician-1: Lex Villanueva  Float Staff: Hema Mcfarland RN    EBL:None    Specimens:   ID Type Source Tests Collected by Time Destination   1 : esophagus bxs @ 34cm Preservative Esophagus  Elisabet Allen MD 10/3/2022 8737 Pathology   2 : esophagus bxs @  32cm Preservative Esophagus  Elisabet Allen MD 10/3/2022 2651 Pathology   3 : random colon bxs Preservative Colon  Elisabet Allen MD 10/3/2022 2292 Pathology       Findings: See printed and scanned procedure note    Complications: None    Dr. Sarah Giron MD  10/3/2022  9:12 AM

## 2022-10-03 NOTE — PERIOP NOTES
Assumed care of pt from ENDO. No c/o voiced. Will cont to monitor. [id number]  Dr. Jose G Barragan was informed of low blood pressures at several intervals with the instructions for me to cont to hold the pt in PACU. With the blood pressure at 1013  Dr. Jose G Barragan was again notified regarding same. Instructed to infusing 200 ml Normal Saline at that time. Will cont to monitor. 1056  Transferred to Phase 2 level of care. Pt denies pain, shortness of breath or lightheadedness.

## 2022-10-03 NOTE — ANESTHESIA PREPROCEDURE EVALUATION
Relevant Problems   No relevant active problems       Anesthetic History   No history of anesthetic complications            Review of Systems / Medical History  Patient summary reviewed and pertinent labs reviewed    Pulmonary        Sleep apnea: BiPAP           Neuro/Psych       CVA       Cardiovascular    Hypertension        Dysrhythmias   Pacemaker         GI/Hepatic/Renal  Within defined limits              Endo/Other  Within defined limits           Other Findings              Physical Exam    Airway  Mallampati: II  TM Distance: > 6 cm  Neck ROM: normal range of motion   Mouth opening: Normal     Cardiovascular  Regular rate and rhythm,  S1 and S2 normal,  no murmur, click, rub, or gallop             Dental  No notable dental hx       Pulmonary  Breath sounds clear to auscultation               Abdominal  GI exam deferred       Other Findings            Anesthetic Plan    ASA: 4  Anesthesia type: MAC          Induction: Intravenous  Anesthetic plan and risks discussed with: Patient

## 2024-06-24 ENCOUNTER — HOSPITAL ENCOUNTER (OUTPATIENT)
Facility: HOSPITAL | Age: 84
Setting detail: RECURRING SERIES
Discharge: HOME | End: 2024-06-27
Payer: MEDICARE

## 2024-06-24 PROCEDURE — 97535 SELF CARE MNGMENT TRAINING: CPT

## 2024-06-24 PROCEDURE — 97162 PT EVAL MOD COMPLEX 30 MIN: CPT

## 2024-06-24 PROCEDURE — 97110 THERAPEUTIC EXERCISES: CPT

## 2024-06-24 NOTE — PROGRESS NOTES
progress to PLOF and address remaining functional goals. (see flow sheet as applicable)     Details if applicable:     8 8 19025 Self Care/Home Management (timed):  improve patient knowledge and understanding of activity modification  to improve patient's ability to progress to PLOF and address remaining functional goals.  (see flow sheet as applicable)     Details if applicable:            Details if applicable:            Details if applicable:            Details if applicable:       [facility] Totals Reminder: bill using total billable min of TIMED therapeutic procedures (example: do not include dry needle or estim unattended, both untimed codes, in totals to left)  8-22 min = 1 unit; 23-37 min = 2 units; 38-52 min = 3 units; 53-67 min = 4 units; 68-82 min = 5 units   Total Total     [x]  Patient Education billed concurrently with other procedures   [x] Review HEP    [] Progressed/Changed HEP, detail:    [] Other detail:        Pain Level (0-10 scale) post treatment: 0    ASSESSMENT/Changes in Function: Patient is an 84 year old male presenting to Physical Therapy with c/o dyssynergic defecation, constipation,  and  fecal incontinence which is limiting ability function at previous level. Patient has no pain complaints with palpation of the pelvic floor muscles anally.  Patient has CHF and is taking a diuretic.  He was told that he has to limit his fluids to 54 ounces of water daily due to kidney failure. Patient presents with decreased strength, coordination, and endurance of the pelvic floor muscles and decreased strength of postural stabilizers. Patient presents with limited understanding of bowel anatomy/function and dietary irritants. I feel patient would benefit from skilled therapeutic intervention to optimize highest functional level possible.     Patient will continue to benefit from skilled PT services to modify and progress therapeutic interventions, address functional mobility deficits, address ROM 
Signature:____________________  Date:____________Time:____________                                      Ellie España, *      Please sign and return to [facility]  [address] [place] [address]  Ph [phone]  Fx [phone]

## 2024-07-15 ENCOUNTER — HOSPITAL ENCOUNTER (OUTPATIENT)
Facility: HOSPITAL | Age: 84
Setting detail: RECURRING SERIES
Discharge: HOME | End: 2024-07-18
Payer: MEDICARE

## 2024-07-15 PROCEDURE — 97110 THERAPEUTIC EXERCISES: CPT

## 2024-07-15 PROCEDURE — 97530 THERAPEUTIC ACTIVITIES: CPT

## 2024-07-15 PROCEDURE — 97112 NEUROMUSCULAR REEDUCATION: CPT

## 2024-07-15 NOTE — PROGRESS NOTES
[facility]  [address] [place] [address]  Ph [phone]  Fx [phone]    Physical Therapy Progress Note  Patient name: Александр Desir Start of Care: 2024   Referral source: LakhwinderEllie, * : 1940               Medical Diagnosis: Other symptoms and signs involving the genitourinary system [R39.89]    Onset Date:2024               Treatment Diagnosis: Other symptoms and signs involving the genitourinary system [R39.89]   Prior Hospitalization: see medical history Provider#: [id number]   Medications: Verified on Patient summary List    Comorbidities: mensicus surgery on left knee, left ankle surgery for ruptured tibial tendon, inguinal hernia 4x, pacemaker, HTN , CHF, Kidney failure - restricted to 56 ounces of water    Prior Level of Function: active lifestyle no constipation or loose stool, FI     Visits from Start of Care: 2    Missed Visits: 0    Updated Goals/Measure of Progress: To be achieved in 6 treatments:    Short term goals: To be achieved in 4 treatments::  Pt demonstrates proper dietary/fluid habits   bowel health to aid in management of constipation and fecal incontinence.  Eval: Pt consuming 56 ounces of fluid (per MD orders) and he was educated on increasing his fiber to approximately 30 grams  Current:  pt reports drinking 56 ounces and has increased his fiber consumption.   7/15/2024   Pt reports improvement in FI complaints evidenced by decrease in frequency &/or amount of leakage by 50% to improve QOL.  Eval: Pt reports FI about 2-3x/month with a small amount of leakage  PN:  pt reports having 1 episode of FI  Progressing  7/15/2024     Pt will report following a bowel routine with breathing patterns to reduce straining   Eval: not educated on bowel routine  PN:  pt was instructed on bowel routine.  Progressing 7/15/2024           Pt will report bowel movements as  4 on the Ware Stool Scale to improve QOL  Eval: with Miralax and 
5th visit  7/15/2024     Pt demonstrates independence with management tools & exercise program that are beneficial for current condition in order to feel comfortable with Pelvic floor PT D/C & not fear.  Eval: pt unaware of what activities to do reduce her symptoms and to avoid exacerbation of current condition   PN:  pt reports using the HEP occasionally and has tried to implement some of the management tools Progressing 7/15/2024    PLAN  Yes  Continue plan of care  []  Upgrade activities as tolerated  []  Discharge due to :  []  Other:    Natali Moon PT    7/15/2024    11:47 AM    Future Appointments   Date Time Provider Department Center   7/15/2024  1:10 PM Natali Moon, PT [facility]   7/26/2024 11:10 AM Natali Moon, PT [facility]   7/29/2024 11:10 AM Natali Moon, PT [facility]   8/5/2024 11:50 AM Natali Moon, PT [facility]   8/12/2024 12:30 PM Natali Moon, PT [facility]   8/19/2024 12:30 PM Jennifer Moona, PT [facility]   8/26/2024 12:30 PM Natali Moon, PT [facility]

## 2024-07-26 ENCOUNTER — HOSPITAL ENCOUNTER (OUTPATIENT)
Facility: HOSPITAL | Age: 84
Setting detail: RECURRING SERIES
Discharge: HOME | End: 2024-07-29
Payer: MEDICARE

## 2024-07-26 PROCEDURE — 97530 THERAPEUTIC ACTIVITIES: CPT

## 2024-07-26 PROCEDURE — 97110 THERAPEUTIC EXERCISES: CPT

## 2024-07-26 PROCEDURE — 97112 NEUROMUSCULAR REEDUCATION: CPT

## 2024-07-26 NOTE — PROGRESS NOTES
PHYSICAL / OCCUPATIONAL THERAPY - DAILY TREATMENT NOTE     Patient Name: Александр Desir    Date: 2024    : 1940  Insurance: Payor: MEDICARE / Plan: MEDICARE PART A AND B / Product Type: *No Product type* /      Patient  verified Yes     Visit #   Current / Total 3 8   Time   In / Out 1110 1150   Pain   In / Out 0 0   Subjective Functional Status/Changes: Pt reports no FI   Changes to:  Allergies, Med Hx, Sx Hx?   no       TREATMENT AREA =  Other symptoms and signs involving the genitourinary system [R39.89]    If an interpreting service is utilized for treatment of this patient, the contents of this document represent the material reviewed with the patient via the .     OBJECTIVE          Therapeutic Procedures:  Tx Min Billable or 1:1 Min (if diff from Tx Min) Procedure, Rationale, Specifics   22  60563 Therapeutic Exercise (timed):  increase ROM, strength, coordination, balance, and proprioception to improve patient's ability to progress to PLOF and address remaining functional goals. (see flow sheet as applicable)    Details if applicable:       8  17482 Neuromuscular Re-Education (timed):  improve balance, coordination, kinesthetic sense, posture, core stability and proprioception to improve patient's ability to develop conscious control of individual muscles and awareness of position of extremities in order to progress to PLOF and address remaining functional goals. (see flow sheet as applicable)    Details if applicable:  review TA activation and breathing   10 10 34551 Therapeutic Activity (timed):  use of dynamic activities replicating functional movements to increase ROM, strength, coordination, balance, and proprioception in order to improve patient's ability to progress to PLOF and address remaining functional goals.  (see flow sheet as applicable)     Details if applicable:           Details if applicable:            Details if applicable:     40 40 [facility] Totals Reminder:

## 2024-07-28 NOTE — PROGRESS NOTES
PHYSICAL / OCCUPATIONAL THERAPY - DAILY TREATMENT NOTE     Patient Name: Александр Desir    Date: 2024    : 1940  Insurance: Payor: MEDICARE / Plan: MEDICARE PART A AND B / Product Type: *No Product type* /      Patient  verified Yes     Visit #   Current / Total 4 8   Time   In / Out 1116 1154   Pain   In / Out 0 0   Subjective Functional Status/Changes: Pt reports using bowel routine   Changes to:  Allergies, Med Hx, Sx Hx?   no       TREATMENT AREA =  Other symptoms and signs involving the genitourinary system [R39.89]    If an interpreting service is utilized for treatment of this patient, the contents of this document represent the material reviewed with the patient via the .     OBJECTIVE          Therapeutic Procedures:  Tx Min Billable or 1:1 Min (if diff from Tx Min) Procedure, Rationale, Specifics   25 25 22534 Neuromuscular Re-Education (timed):  improve balance, coordination, kinesthetic sense, posture, core stability and proprioception to improve patient's ability to develop conscious control of individual muscles and awareness of position of extremities in order to progress to PLOF and address remaining functional goals. (see flow sheet as applicable)    Details if applicable:       13 13 72334 Therapeutic Exercise (timed):  increase ROM, strength, coordination, balance, and proprioception to improve patient's ability to progress to PLOF and address remaining functional goals. (see flow sheet as applicable)    Details if applicable:            Details if applicable:           Details if applicable:            Details if applicable:     38 38 [facility] Totals Reminder: bill using total billable min of TIMED therapeutic procedures (example: do not include dry needle or estim unattended, both untimed codes, in totals to left)  8-22 min = 1 unit; 23-37 min = 2 units; 38-52 min = 3 units; 53-67 min = 4 units; 68-82 min = 5 units   Total Total     TOTAL TREATMENT TIME:        38

## 2024-07-29 ENCOUNTER — HOSPITAL ENCOUNTER (OUTPATIENT)
Facility: HOSPITAL | Age: 84
Setting detail: RECURRING SERIES
Discharge: HOME | End: 2024-08-01
Payer: MEDICARE

## 2024-07-29 PROCEDURE — 97112 NEUROMUSCULAR REEDUCATION: CPT

## 2024-07-29 PROCEDURE — 97530 THERAPEUTIC ACTIVITIES: CPT

## 2024-08-05 ENCOUNTER — HOSPITAL ENCOUNTER (OUTPATIENT)
Facility: HOSPITAL | Age: 84
Setting detail: RECURRING SERIES
Discharge: HOME | End: 2024-08-08
Payer: MEDICARE

## 2024-08-05 PROCEDURE — 97530 THERAPEUTIC ACTIVITIES: CPT

## 2024-08-05 PROCEDURE — 97110 THERAPEUTIC EXERCISES: CPT

## 2024-08-05 NOTE — PROGRESS NOTES
PHYSICAL / OCCUPATIONAL THERAPY - DAILY TREATMENT NOTE     Patient Name: Александр Desir    Date: 2024    : 1940  Insurance: Payor: MEDICARE / Plan: MEDICARE PART A AND B / Product Type: *No Product type* /      Patient  verified Yes     Visit #   Current / Total 5 8   Time   In / Out 1150 1230   Pain   In / Out 0 0   Subjective Functional Status/Changes: Pt reports using less mirilax   Changes to:  Allergies, Med Hx, Sx Hx?   no       TREATMENT AREA =  Other symptoms and signs involving the genitourinary system [R39.89]    If an interpreting service is utilized for treatment of this patient, the contents of this document represent the material reviewed with the patient via the .     OBJECTIVE          Therapeutic Procedures:  Tx Min Billable or 1:1 Min (if diff from Tx Min) Procedure, Rationale, Specifics   30 30 15217 Therapeutic Exercise (timed):  increase ROM, strength, coordination, balance, and proprioception to improve patient's ability to progress to PLOF and address remaining functional goals. (see flow sheet as applicable)    Details if applicable:            10 10 72554 Therapeutic Activity (timed):  use of dynamic activities replicating functional movements to increase ROM, strength, coordination, balance, and proprioception in order to improve patient's ability to progress to PLOF and address remaining functional goals.  (see flow sheet as applicable)     Details if applicable:           Details if applicable:            Details if applicable:     40 40 [facility] Totals Reminder: bill using total billable min of TIMED therapeutic procedures (example: do not include dry needle or estim unattended, both untimed codes, in totals to left)  8-22 min = 1 unit; 23-37 min = 2 units; 38-52 min = 3 units; 53-67 min = 4 units; 68-82 min = 5 units   Total Total     TOTAL TREATMENT TIME:        40     [x]  Patient Education billed concurrently with other procedures   [x] Review HEP    []

## 2024-08-09 ENCOUNTER — TELEPHONE (OUTPATIENT)
Facility: HOSPITAL | Age: 84
End: 2024-08-09

## 2024-08-12 ENCOUNTER — APPOINTMENT (OUTPATIENT)
Facility: HOSPITAL | Age: 84
End: 2024-08-12
Payer: MEDICARE

## 2024-08-19 ENCOUNTER — APPOINTMENT (OUTPATIENT)
Facility: HOSPITAL | Age: 84
End: 2024-08-19
Payer: MEDICARE

## 2024-08-26 ENCOUNTER — APPOINTMENT (OUTPATIENT)
Facility: HOSPITAL | Age: 84
End: 2024-08-26
Payer: MEDICARE

## 2024-08-27 ENCOUNTER — TELEPHONE (OUTPATIENT)
Facility: HOSPITAL | Age: 84
End: 2024-08-27

## 2024-08-30 ENCOUNTER — APPOINTMENT (OUTPATIENT)
Facility: HOSPITAL | Age: 84
End: 2024-08-30
Payer: MEDICARE

## 2024-09-06 ENCOUNTER — HOSPITAL ENCOUNTER (OUTPATIENT)
Facility: HOSPITAL | Age: 84
Setting detail: RECURRING SERIES
Discharge: HOME | End: 2024-09-09
Payer: MEDICARE

## 2024-09-06 PROCEDURE — 97110 THERAPEUTIC EXERCISES: CPT

## 2024-09-06 PROCEDURE — 97530 THERAPEUTIC ACTIVITIES: CPT

## 2024-09-06 PROCEDURE — 97112 NEUROMUSCULAR REEDUCATION: CPT

## 2024-09-10 ENCOUNTER — TELEPHONE (OUTPATIENT)
Facility: HOSPITAL | Age: 84
End: 2024-09-10

## 2024-09-13 ENCOUNTER — APPOINTMENT (OUTPATIENT)
Facility: HOSPITAL | Age: 84
End: 2024-09-13
Payer: MEDICARE

## 2024-10-04 ENCOUNTER — APPOINTMENT (OUTPATIENT)
Facility: HOSPITAL | Age: 84
End: 2024-10-04
Payer: MEDICARE

## 2024-10-09 ENCOUNTER — HOSPITAL ENCOUNTER (OUTPATIENT)
Facility: HOSPITAL | Age: 84
Setting detail: RECURRING SERIES
Discharge: HOME | End: 2024-10-12
Payer: MEDICARE

## 2024-10-09 PROCEDURE — 97110 THERAPEUTIC EXERCISES: CPT

## 2024-10-09 PROCEDURE — 97530 THERAPEUTIC ACTIVITIES: CPT

## 2024-10-09 PROCEDURE — 97112 NEUROMUSCULAR REEDUCATION: CPT

## 2024-10-09 NOTE — PROGRESS NOTES
PHYSICAL / OCCUPATIONAL THERAPY - DAILY TREATMENT NOTE     Patient Name: Александр Desir    Date: 10/9/2024    : 1940  Insurance: Payor: MEDICARE / Plan: MEDICARE PART A AND B / Product Type: *No Product type* /      Patient  verified Yes     Visit #   Current / Total 7 8   Time   In / Out 1152 1232   Pain   In / Out 0 0   Subjective Functional Status/Changes: Pt reports feeling ready to discharge   Changes to:  Allergies, Med Hx, Sx Hx?   no       TREATMENT AREA =  Other symptoms and signs involving the genitourinary system [R39.89]    If an interpreting service is utilized for treatment of this patient, the contents of this document represent the material reviewed with the patient via the .     OBJECTIVE        Therapeutic Procedures:  Tx Min Billable or 1:1 Min (if diff from Tx Min) Procedure, Rationale, Specifics   22  21844 Therapeutic Exercise (timed):  increase ROM, strength, coordination, balance, and proprioception to improve patient's ability to progress to PLOF and address remaining functional goals. (see flow sheet as applicable)    Details if applicable:       8  25515 Neuromuscular Re-Education (timed):  improve balance, coordination, kinesthetic sense, posture, core stability and proprioception to improve patient's ability to develop conscious control of individual muscles and awareness of position of extremities in order to progress to PLOF and address remaining functional goals. (see flow sheet as applicable)    Details if applicable:     10 10 96985 Therapeutic Activity (timed):  use of dynamic activities replicating functional movements to increase ROM, strength, coordination, balance, and proprioception in order to improve patient's ability to progress to PLOF and address remaining functional goals.  (see flow sheet as applicable)     Details if applicable:           Details if applicable:            Details if applicable:     40 40 [facility] Totals Reminder: bill using total 
improve strength to decrease reported symptoms.   Eval: PFM 3/5, 5 sec hold, 4 reps, with gluteal substitution  PN:  deferred 7/15/2024  Current: not assessed today 9/6/24  PN  not assessed D/C GOAL 10/9/2024     Pt will be able to perform bulge without external anal sphincter contraction to reduce his need to strain to have a bowel movement  Eval:  external anal sphincter contracted with bulge (noted externally and felt with internal)  PN:  Not assessed 7/15/2024  Current: pt reports inability to feel bulge slow progress 9/6/24  PN:  pt reports being able to bulge and is working with breathing to help with defecation.  GOAL MET 10/9/2024     Pt demonstrates improvement of current complaints evidenced by a 40 point  improvement in PFDI-20  Eval: 72.92  PN:  Will test at the 5th visit  7/15/2024  Current: 186.47 REGRESSION 9/6/24  PN:  56.25  D/C GOAL 10/9/2024    Pt demonstrates independence with management tools & exercise program that are beneficial for current condition in order to feel comfortable with Pelvic floor PT D/C & not fear.  Eval: pt unaware of what activities to do reduce her symptoms and to avoid exacerbation of current condition   PN:  pt reports using the HEP occasionally and has tried to implement some of the management tools Progressing 7/15/2024  Current: pt reports HEP semi-compliance \"when I remember to do them\" 9/6/24  PN: Patient reports feeling comfortable with discharging today and continuing to progress at home with HEP and management tools.  GOAL MET 10/9/2024     Assessment/ Summary of Care: Pt cancelled appointment when PN was due. PN was written at earliest opportunity. Patient is an 84 year old male who has attended 7 physical therapy for  c/o dyssynergic defecation, constipation, and fecal incontinence.  Patient reports no longer needing to strain to have bowel movements.  He reports less constipation and more looser stools.  Discussed lowering the amount of miralax.  He reports

## 2025-01-09 ENCOUNTER — TRANSCRIBE ORDERS (OUTPATIENT)
Facility: HOSPITAL | Age: 85
End: 2025-01-09

## 2025-01-09 DIAGNOSIS — R13.19 OTHER DYSPHAGIA: Primary | ICD-10-CM

## 2025-01-09 DIAGNOSIS — Z91.89 AT RISK FOR ASPIRATION: ICD-10-CM

## 2025-01-09 DIAGNOSIS — Z86.73 HISTORY OF STROKE: ICD-10-CM

## 2025-01-15 ENCOUNTER — HOSPITAL ENCOUNTER (OUTPATIENT)
Facility: HOSPITAL | Age: 85
Discharge: HOME | End: 2025-01-18
Payer: MEDICARE

## 2025-01-15 DIAGNOSIS — R13.19 OTHER DYSPHAGIA: ICD-10-CM

## 2025-01-15 DIAGNOSIS — Z91.89 AT RISK FOR ASPIRATION: ICD-10-CM

## 2025-01-15 DIAGNOSIS — Z86.73 HISTORY OF STROKE: ICD-10-CM

## 2025-01-15 PROCEDURE — 74230 X-RAY XM SWLNG FUNCJ C+: CPT

## 2025-01-15 PROCEDURE — 92526 ORAL FUNCTION THERAPY: CPT

## 2025-01-15 PROCEDURE — 2500000003 HC RX 250 WO HCPCS

## 2025-01-15 PROCEDURE — 92611 MOTION FLUOROSCOPY/SWALLOW: CPT

## 2025-01-15 RX ADMIN — BARIUM SULFATE 40 ML: 400 PASTE ORAL at 10:08

## 2025-01-15 RX ADMIN — BARIUM SULFATE 1 TABLET: 700 TABLET ORAL at 10:08

## 2025-01-15 RX ADMIN — BARIUM SULFATE 80 ML: 960 POWDER, FOR SUSPENSION ORAL at 10:07

## 2025-01-15 NOTE — PROGRESS NOTES
Self-fed/presented;SLP-fed/Presented;Cup/sip;Cup/gulp;Spoon;Straw;Successive Swallows     Bolus Acceptance: No impairment     Bolus Formation/Control: No impairment       Propulsion: No impairment       Oral Residue: Less than 10% of bolus;Lingual           Timing: No impairment     Penetration: None     Aspiration/Timing: No evidence of aspiration     Pharyngeal Clearance: Vallecular residue;10-50%;Less than 10%     Attempted Modifications: Alternate liquids/solids;Cup/sip;Cup/gulp;Double swallow;Small sips and bites;Spoon;Straw     Cues: Minimal       Swallowing Physiology  Decreased Tongue Base Retraction?: Yes  Laryngeal Elevation: WFL (within functional limits)  Penetration-Aspiration Scale (PAS): 1 - Material does not enter the airway  Pharyngeal Symmetry: Symmetrical  Pharyngeal-Esophageal Segment: No impairment  Pharyngeal Dysfunction: Decreased tongue base retraction  Findings  Oral Phase Severity: No impairment  Pharyngeal Phase Severity: Mild    8-point Penetration-Aspiration Scale: Score 1    PAIN:  Pain:  Intensity Prior to MBS: 0/10   Intensity After MBS: 0/10    COMMUNICATION/EDUCATION:   [x]  Post diagnostic testing education including oropharyngeal anatomy/physiology, MBS results, diet recommendations and compensatory strategies/positioning provided via demonstration, verbalization and teach back of comprehension   [x]  Video feedback utilized   []  Handout regarding diet recommendations and thickener instructions provided.  [x]  Patient/family have participated as able in goal setting and plan of care  []  Patient/family agree to work toward stated goals and plan of care  []  Patient understands intent and goals of therapy but is neutral about his/her participation  []  Patient unable to participate in goal setting/plan of care secondary to cognition, hearing/vision deficits; education ongoing with interdisciplinary staff    Thank you for this referral,    Angy House M.S.,

## 2025-01-30 ENCOUNTER — HOSPITAL ENCOUNTER (OUTPATIENT)
Facility: HOSPITAL | Age: 85
Setting detail: RECURRING SERIES
End: 2025-01-30
Payer: MEDICARE

## 2025-01-30 PROCEDURE — 92610 EVALUATE SWALLOWING FUNCTION: CPT

## 2025-01-30 PROCEDURE — 92526 ORAL FUNCTION THERAPY: CPT

## 2025-01-30 NOTE — PROGRESS NOTES
ST DAILY TREATMENT NOTE/ SWALLOW EVAL & TX    Patient Name: Александр Desir  Date:2025  : 1940  [x]  Patient  Verified  Payor: MEDICARE / Plan: MEDICARE PART A AND B / Product Type: *No Product type* /   In time:0950  Out time:1030  Total Treatment Time (min): 40  Visit #: 1 of 16    SUBJECTIVE  Pain Level (0-10 scale): 0  Any medication changes, allergies to medications, adverse drug reactions, diagnosis change, or new procedure performed?: [] No    [x] Yes (see summary sheet for update)  Subjective functional status/changes:   [] No changes reported  \"Thank you for all of your expertise and information\"  Date of Onset: 1/15/25  Social History:  and retired  Prior Functional level: WFL for speech, language, cognition, and swallowing function  Radiology: MBSS completed 1/15/25  Current diet: Regular solids and thin liquids  positioning: Upright in chair  Mental Status:  [x]alert []lethargic []confused  Orientation: [x]person [x]place [x]time [x]situation  AC Directions: []1-step []2-step []3-step [x]complex  Motivation: [x]excellent []good  []fair  []poor   Barriers to learning: [x]none []aphasia []?cognition []lethargy/motivation []Anvik   []?vision []language []Fatigue/pain []psych factors compensate with:   Dentition: [x]normal []abnormal []edentulous []dentures   Respiratory Status: [x]WFL []SOB  []O2 L/min:  []NC []Mask               []Trach Tube:                     []Excess secretions  Lips:  [x]Symmetrical []asymmetrical  Retraction [x]WFL  []?min []?mod []?max  Protrusion [x]WFL  []?min []?mod []?max  Strength [x]WFL  []?min []?mod []?max  Puff  [x]WFL  []?min []?mod []?max  Tongue:  [x]Symmetrical []asymmetrical  Protrusion [x]WFL  []?min []?mod []?max  Elevation [x]WFL  []?min []?mod []?max  Depression [x]WFL  []?min []?mod []?max  Lateralization [x]WFL  []?min []?mod []?max  Strength [x]WFL  []?min []?mod []?max  Gag

## 2025-01-30 NOTE — PROGRESS NOTES
[facility]  [address] [place] [address]  Ph [phone]  Fx [phone]    Plan of Care/ Statement of Necessity for Speech Therapy Services    Patient name: Александр Desir Start of Care: 2025   Referral source: Enoch Españailia, * : 1940    Medical Diagnosis: Dysphagia, unspecified [R13.10]  Payor: MEDICARE / Plan: MEDICARE PART A AND B / Product Type: *No Product type* /  Onset Date:1/15/25    Treatment Diagnosis: Dysphagia, unspecified R13.10   Prior Hospitalization: see medical history Provider#: [id number]    Comorbidities: Respiratory disorders and Other: CHF, TIA, CKD, and A-fib   Prior Level of Function: WFL for speech, language, cognition, and swallowing function       The Plan of Care and following information is based on the information from the initial evaluation.    Assessment/ tompkins information: OP SWALLOW EVALUATION  Александр Desir is a 85 yo male who presents to [facility] d/t dysphagia recently identified during an MBSS performed 1/15/25. PMHx of A-fib, COPD, CKD, CHF, and TIA. MBSS significant for \"mild pharyngeal dysphagia and concerns for possible esophageal dysphagia c/b reduced base of tongue retraction w/ vallecular residue and esophageal retention of bolus.\" Pt self reported to have GERD and Martini's esophagus and is being followed by GI. Endorses a regular solid diet and thin liquids at home. Reports he takes larger pills w/ puree as they sometimes become \"stuck\".     Based on the objective data described below, the pt presents with mild pharyngeal dysphagia. Pt seen today for a clinical swallow evaluation. An OME found structures and function of the orofacial musculature appear adequate for mastication and deglutition. Pt w/ intact dentition and a clean and moist oral cavity.      One delayed throat clear observed w/ a liquid wash following regular solids. No other overt s/sx aspiration observed w/ dx trials of  ICU

## 2025-02-04 ENCOUNTER — HOSPITAL ENCOUNTER (OUTPATIENT)
Facility: HOSPITAL | Age: 85
Setting detail: RECURRING SERIES
Discharge: HOME | End: 2025-02-07
Payer: MEDICARE

## 2025-02-04 PROCEDURE — 92526 ORAL FUNCTION THERAPY: CPT

## 2025-02-04 NOTE — PROGRESS NOTES
ST DAILY TREATMENT NOTE    Patient Name: Александр Desir  Date:2025  : 1940  [x]  Patient  Verified  Payor: Payor: MEDICARE / Plan: MEDICARE PART A AND B / Product Type: *No Product type* /   In time:905  Out time:949  Total Treatment Time (min): 44  Visit #: 2 of 16    Treatment Diagnosis: Dysphagia, unspecified [R13.10]    SUBJECTIVE  Pain Level (0-10 scale): 0    Subjective functional status/changes:   [x] No changes reported  \"Sometimes I can taste acid\"    OBJECTIVE  Treatment provided includes:  Increase/Improve:  []  Voice Quality []  Cognitive Linguistic Skills [x]  Laryngeal/Pharyngeal Exercises   []  Vocal Loudness []  Reading Comprehension [x]  Swallowing Skills    []  Vocal Cord Function []  Auditory Comprehension []  Oral Motor Skills   []  Resonance []  Writing Skills [x]  Compensatory strategies    []  Speech Intelligibility []  Expressive Language []  Attention   []  Breath Support/Coord. []  Receptive language []  Memory   []  Articulation []  Safety Awareness [x] Education   []  Fluency []  Word Retrieval []      Treatment Provided:  Education RE swallowing anatomy and physiology, aspiration, safe swallow compensatory strategies, GERD/LPR, and reflux precautions and strengthening the biomechanics of the swallow through effortful swallows    Patient/Caregiver  Education: [x] Review HEP      HEP/Handouts given: Multiple handout provided RE normal swallowing and aspiration, aspiration precautions, strategies for swallow safety, all about reflux, and managing reflux. HEP initiated of 25 effortful swallows per day.    Pain Level (0-10 scale) post treatment: 0    ASSESSMENT   During education, pt benefited from video demonstrations on Microarrays and SLP examples and repetition to enhance comprehension of concepts. Suspect good carryover. During effortful swallows, pt endorsed a level 4 of perceived effort on a scale of 1-5. Pt was provided w/ suggestions for pacing swallow exercises,

## 2025-02-06 ENCOUNTER — TELEPHONE (OUTPATIENT)
Facility: HOSPITAL | Age: 85
End: 2025-02-06

## 2025-02-06 NOTE — TELEPHONE ENCOUNTER
Pt called with referral to get sched for pt jessica, is currently in speech therapy at [facility] location. informed pt we are unable to see him until he is discharged. Pt stated they will let us know when they are discharged so that can get scheduled here for PT.

## 2025-02-10 ENCOUNTER — TELEPHONE (OUTPATIENT)
Facility: HOSPITAL | Age: 85
End: 2025-02-10

## 2025-02-11 ENCOUNTER — HOSPITAL ENCOUNTER (OUTPATIENT)
Facility: HOSPITAL | Age: 85
Setting detail: RECURRING SERIES
End: 2025-02-11
Payer: MEDICARE

## 2025-02-18 ENCOUNTER — APPOINTMENT (OUTPATIENT)
Facility: HOSPITAL | Age: 85
End: 2025-02-18
Payer: MEDICARE

## 2025-02-25 ENCOUNTER — APPOINTMENT (OUTPATIENT)
Facility: HOSPITAL | Age: 85
End: 2025-02-25
Payer: MEDICARE

## 2025-02-27 ENCOUNTER — HOSPITAL ENCOUNTER (OUTPATIENT)
Facility: HOSPITAL | Age: 85
Setting detail: RECURRING SERIES
End: 2025-02-27
Payer: MEDICARE

## 2025-02-27 PROCEDURE — 97110 THERAPEUTIC EXERCISES: CPT

## 2025-02-27 PROCEDURE — 97162 PT EVAL MOD COMPLEX 30 MIN: CPT

## 2025-02-27 PROCEDURE — 97530 THERAPEUTIC ACTIVITIES: CPT

## 2025-02-27 NOTE — PROGRESS NOTES
[facility] at the [address], [address], [place] [address]   Phone: [phone]     Fax: [phone]       Plan of Care/ Statement of Necessity for Physical Therapy Services    Patient name: Александр Desir Start of Care: 2025   Referral source: Joseph Rod MD : 1940    Medical Diagnosis: Left shoulder pain [M25.512]       Onset Date:11/15/2024    Treatment Diagnosis: M79.602  LEFT ARM PAIN                            Prior Hospitalization: see medical history Provider#: [id number]     Comorbidities: CHF/pacemaker, TIA, CKD, A-fib, tibial tendon repair (left), left meniscus repair, gall bladder removal, hearing aids, arthritis, neuropathy, SOB, dysphagia     Prior Level of Function: SPC, functionally independent, semi -active lifestyle     If an interpreting service is utilized for treatment of this patient, the contents of this document represent the material reviewed with the patient via the .       The Plan of Care and following information is based on the information from the initial evaluation.  Assessment/ tompkins information: Mr. Desir is a left handed 84 year old male who reports to OPPT with c/o left shoulder pain. States that he fell in his left shoulder about 8 years ago and then got an injection that improved the pain. Insidious onset of left shoulder pain returned a in the fall of  without incident of injury. States his OA impacts ability to grasp items in hands and he has a constat ache in his left shoulder. Pain/discomfort leaning on SPC , carrying items and lifting overhead. Pt demo a rounded shoulder posture with tenderness to palpation of the left infraspinatus, supraspinatus and long head of bicep tendon. He was positive for the following left shoulder special tests: painful arch, ER> IR lag sign, empty can > Ruiz Silvano test, and infraspinatus resistance test. He demo some  strength deficits and left  Period: 2/27/25 - 4/24/25  Amy Gladis, PT 2/27/2025 11:09 AM  _____________________________________________________________________      Insurance: Payor: MEDICARE / Plan: MEDICARE PART A AND B / Product Type: *No Product type* /      Please sign and return to :  [facility] the [address], [place] [address]           Phone: [phone]      Fax:  [phone]

## 2025-02-27 NOTE — PROGRESS NOTES
PT DAILY TREATMENT NOTE/SHOULDER EVAL     Patient Name: Александр Desir    Date: 2025    : 1940  Insurance: Payor: MEDICARE / Plan: MEDICARE PART A AND B / Product Type: *No Product type* /      Patient  verified yes     Visit #   Current / Total 1 16   Time   In / Out 11:25am 12:05   Pain   In / Out 1 2-3   Subjective Functional Status/Changes: See below        Treatment Area: Left shoulder pain [M25.512]    If an interpreting service is utilized for treatment of this patient, the contents of this document represent the material reviewed with the patient via the .       SUBJECTIVE  Pain Level (0-10 scale): see above  []constant []intermittent []improving []worsening []no change since onset    Any medication changes, allergies to medications, adverse drug reactions, diagnosis change, or new procedure performed?: [x] No    [] Yes (see summary sheet for update)  Subjective functional status/changes:     PLOF: SPC, functionally independent, semi -active lifestyle    Mechanism of Injury: 8 years ago fell on left shoulder  Injection helped  Return of left shoulder pain a couple months ago  X-ray = normal   Current symptoms/Complaints:   - grasping items difficult due OA in hand  - constant ache  - push through discomfort   - challenge to carry items and reach over head   - discomfort leaning on the SPC     *left handed    PMHx/Surgical Hx: CHF/pacemaker, TIA, CKD, A-fib, tibial tendon repair (left), left meniscus repair, gall bladder removal, hearing aids, arthritis, neuropathy, SOB, dysphagia  Work Hx: retired, counseling at [employer]     Pt Goals: pain relief   Barriers: []pain []financial []time []transportation []other    Substance use: []Alcohol []Tobacco []other:   FABQ Score: []low []elevate  Cognition: A & O x 3    Other:    OBJECTIVE/EXAMINATION    20 min [x]Eval                  []Re-Eval       Therapeutic Procedures:  Tx Min Billable or 1:1 Min (if diff from Tx Min) Procedure,  Test  [] Pos   [] Neg SC Joint  [] Pos   [] Neg  Empty Can  [x] Pos   [] Neg Pectoral Tightness [] Pos   [] Neg  Infraspinatus resistance  [x] Pos   [] Neg   Posterior Apprehension [] Pos   [] Neg      Other Tests / Comments:    Quick Dash: 20.5%    Pain Level (0-10 scale) post treatment: see above    ASSESSMENT/Changes in Function: see POC    Patient will continue to benefit from skilled PT services to modify and progress therapeutic interventions, analyze and address functional mobility deficits, analyze and address ROM deficits, analyze and address strength deficits, analyze and address soft tissue restrictions, analyze and cue for proper movement patterns, analyze and modify for postural abnormalities, and analyze and address imbalance/dizziness to address functional deficits and attain remaining goals.     [x]  See POC for goals and assessment     PLAN  []  Upgrade activities as tolerated     [x]  Continue plan of care  []  Update interventions per flow sheet       []  Other:_      Amy Griffith, PT 2/27/2025  11:08 AM

## 2025-03-07 ENCOUNTER — HOSPITAL ENCOUNTER (OUTPATIENT)
Facility: HOSPITAL | Age: 85
Setting detail: RECURRING SERIES
Discharge: HOME | End: 2025-03-10
Payer: MEDICARE

## 2025-03-07 PROCEDURE — 97016 VASOPNEUMATIC DEVICE THERAPY: CPT

## 2025-03-07 PROCEDURE — 97110 THERAPEUTIC EXERCISES: CPT

## 2025-03-07 PROCEDURE — 97112 NEUROMUSCULAR REEDUCATION: CPT

## 2025-03-07 PROCEDURE — 97530 THERAPEUTIC ACTIVITIES: CPT

## 2025-03-07 NOTE — PROGRESS NOTES
3/19/2025  2:00 PM José Lucero, BERENICE [facility]   3/24/2025  2:40 PM José Lucero PT [facility]

## 2025-03-10 ENCOUNTER — TELEPHONE (OUTPATIENT)
Facility: HOSPITAL | Age: 85
End: 2025-03-10

## 2025-03-10 ENCOUNTER — HOSPITAL ENCOUNTER (OUTPATIENT)
Facility: HOSPITAL | Age: 85
Setting detail: RECURRING SERIES
Discharge: HOME | End: 2025-03-13
Payer: MEDICARE

## 2025-03-10 PROCEDURE — 97110 THERAPEUTIC EXERCISES: CPT

## 2025-03-10 PROCEDURE — 97112 NEUROMUSCULAR REEDUCATION: CPT

## 2025-03-10 PROCEDURE — 97530 THERAPEUTIC ACTIVITIES: CPT

## 2025-03-10 NOTE — PROGRESS NOTES
PHYSICAL / OCCUPATIONAL THERAPY - DAILY TREATMENT NOTE     Patient Name: Александр Desir    Date: 3/10/2025    : 1940  Insurance: Payor: MEDICARE / Plan: MEDICARE PART A AND B / Product Type: *No Product type* /      Patient  verified Yes     Visit #   Current / Total 3 16   Time   In / Out 9515 8583   Pain   In / Out 1/10 010   Subjective Functional Status/Changes: Patient reports that the left shoulder constantly aches, but not badly.   Changes to:  Allergies, Med Hx, Sx Hx?   no       TREATMENT AREA =  Left shoulder pain [M25.512]    If an interpreting service is utilized for treatment of this patient, the contents of this document represent the material reviewed with the patient via the .     OBJECTIVE    Therapeutic Procedures:  Tx Min Billable or 1:1 Min (if diff from Tx Min) Procedure, Rationale, Specifics   20  23173 Therapeutic Exercise (timed):  increase ROM, strength, coordination, balance, and proprioception to improve patient's ability to progress to PLOF and address remaining functional goals. (see flow sheet as applicable)    Details if applicable:       16  27794 Neuromuscular Re-Education (timed):  improve balance, coordination, kinesthetic sense, posture, core stability and proprioception to improve patient's ability to develop conscious control of individual muscles and awareness of position of extremities in order to progress to PLOF and address remaining functional goals. (see flow sheet as applicable)    Details if applicable:     12  92997 Therapeutic Activity (timed):  use of dynamic activities replicating functional movements to increase ROM, strength, coordination, balance, and proprioception in order to improve patient's ability to progress to PLOF and address remaining functional goals.  (see flow sheet as applicable)     Details if applicable:     48  [facility] Totals Reminder: bill using total billable min of TIMED therapeutic procedures (example: do not include dry

## 2025-03-13 ENCOUNTER — APPOINTMENT (OUTPATIENT)
Facility: HOSPITAL | Age: 85
End: 2025-03-13
Payer: MEDICARE

## 2025-03-14 ENCOUNTER — HOSPITAL ENCOUNTER (OUTPATIENT)
Facility: HOSPITAL | Age: 85
Setting detail: RECURRING SERIES
Discharge: HOME | End: 2025-03-17
Payer: MEDICARE

## 2025-03-14 PROCEDURE — 97530 THERAPEUTIC ACTIVITIES: CPT

## 2025-03-14 PROCEDURE — 97110 THERAPEUTIC EXERCISES: CPT

## 2025-03-14 PROCEDURE — 97112 NEUROMUSCULAR REEDUCATION: CPT

## 2025-03-14 NOTE — PROGRESS NOTES
PHYSICAL / OCCUPATIONAL THERAPY - DAILY TREATMENT NOTE     Patient Name: Александр Desir    Date: 3/14/2025    : 1940  Insurance: Payor: MEDICARE / Plan: MEDICARE PART A AND B / Product Type: *No Product type* /      Patient  verified Yes     Visit #   Current / Total 4 16   Time   In / Out 1400 1442   Pain   In / Out 2/10 0/10   Subjective Functional Status/Changes: Patient reports that he is feeling very well today.    Changes to:  Allergies, Med Hx, Sx Hx?   no       TREATMENT AREA =  Left shoulder pain [M25.512]    If an interpreting service is utilized for treatment of this patient, the contents of this document represent the material reviewed with the patient via the .     OBJECTIVE      Therapeutic Procedures:  Tx Min Billable or 1:1 Min (if diff from Tx Min) Procedure, Rationale, Specifics   15  13162 Therapeutic Exercise (timed):  increase ROM, strength, coordination, balance, and proprioception to improve patient's ability to progress to PLOF and address remaining functional goals. (see flow sheet as applicable)    Details if applicable:       14  73465 Neuromuscular Re-Education (timed):  improve balance, coordination, kinesthetic sense, posture, core stability and proprioception to improve patient's ability to develop conscious control of individual muscles and awareness of position of extremities in order to progress to PLOF and address remaining functional goals. (see flow sheet as applicable)    Details if applicable:     13  29370 Therapeutic Activity (timed):  use of dynamic activities replicating functional movements to increase ROM, strength, coordination, balance, and proprioception in order to improve patient's ability to progress to PLOF and address remaining functional goals.  (see flow sheet as applicable)     Details if applicable:     42  [facility] Totals Reminder: bill using total billable min of TIMED therapeutic procedures (example: do not include dry needle or estim

## 2025-03-17 ENCOUNTER — HOSPITAL ENCOUNTER (OUTPATIENT)
Facility: HOSPITAL | Age: 85
Setting detail: RECURRING SERIES
Discharge: HOME | End: 2025-03-20
Payer: MEDICARE

## 2025-03-17 PROCEDURE — 97110 THERAPEUTIC EXERCISES: CPT

## 2025-03-17 PROCEDURE — 97530 THERAPEUTIC ACTIVITIES: CPT

## 2025-03-17 PROCEDURE — 97112 NEUROMUSCULAR REEDUCATION: CPT

## 2025-03-17 NOTE — PROGRESS NOTES
position   Current (03/14/2025): left shoulder abduction PROM: 152 degrees, MET     Long Term Goals: LTG- To be accomplished in 8 week(s):  1.  Pt will report a worst left shoulder pain of a 1/10 to improve functional activity tolerance for house work and volunteering activities.  Eval:worst pain = 3-4/10      2.  Pt will improve left shoulder AROM by 20 deg in each direction or WFL to increase mobility required for ADL/IADLs.  Eval:  AROM Left Right   Flexion 150 150   Scaption/ 165   ER @ 90 Degrees 80 90   IR @ 90 Degrees 30 40   *assessment done in a reclined position      3.  Pt will increase  strength to 55lbs bilaterally to improve UE strength required for yard work and volunteering activities.  Eval: Strength: right = 50lbs, left = 42lbs    Current (03/10/2025): right = 54 lbs, left = 46 lbs      4.  Pt QD score will improve by 10% to show improvement in functional activity tolerance and improve QOL.   Eval: Quick Dash: 20.5%   Current (03/17/2025): 11.4%, progressing    PLAN  Yes  Continue plan of care  []  Upgrade activities as tolerated  []  Discharge due to :  []  Other:    Daxa Carbajal PT    3/17/2025    2:24 PM    Future Appointments   Date Time Provider Department Center   3/17/2025  4:00 PM Daxa Carbajal PT [facility] [place]   3/19/2025  2:00 PM José Lucero PT [facility] [place]   3/24/2025  2:40 PM José Lucero PT [facility] [place]

## 2025-03-19 ENCOUNTER — TELEPHONE (OUTPATIENT)
Facility: HOSPITAL | Age: 85
End: 2025-03-19

## 2025-03-19 ENCOUNTER — HOSPITAL ENCOUNTER (OUTPATIENT)
Facility: HOSPITAL | Age: 85
Setting detail: RECURRING SERIES
Discharge: HOME | End: 2025-03-22
Payer: MEDICARE

## 2025-03-19 PROCEDURE — 97110 THERAPEUTIC EXERCISES: CPT

## 2025-03-19 PROCEDURE — 97530 THERAPEUTIC ACTIVITIES: CPT

## 2025-03-19 PROCEDURE — 97112 NEUROMUSCULAR REEDUCATION: CPT

## 2025-03-19 NOTE — PROGRESS NOTES
to :  []  Other:    José Lucero, PT    3/19/2025    2:05 PM    Future Appointments   Date Time Provider Department Center   3/24/2025  2:40 PM [facility]

## 2025-03-24 ENCOUNTER — APPOINTMENT (OUTPATIENT)
Facility: HOSPITAL | Age: 85
End: 2025-03-24
Payer: MEDICARE

## 2025-03-25 ENCOUNTER — APPOINTMENT (OUTPATIENT)
Facility: HOSPITAL | Age: 85
End: 2025-03-25
Payer: MEDICARE

## 2025-03-26 ENCOUNTER — APPOINTMENT (OUTPATIENT)
Facility: HOSPITAL | Age: 85
End: 2025-03-26
Payer: MEDICARE

## 2025-04-08 ENCOUNTER — APPOINTMENT (OUTPATIENT)
Facility: HOSPITAL | Age: 85
End: 2025-04-08
Payer: MEDICARE

## 2025-04-09 ENCOUNTER — HOSPITAL ENCOUNTER (OUTPATIENT)
Facility: HOSPITAL | Age: 85
Setting detail: RECURRING SERIES
Discharge: HOME | End: 2025-04-12
Payer: MEDICARE

## 2025-04-09 PROCEDURE — 97110 THERAPEUTIC EXERCISES: CPT

## 2025-04-09 PROCEDURE — 97530 THERAPEUTIC ACTIVITIES: CPT

## 2025-04-09 PROCEDURE — 97112 NEUROMUSCULAR REEDUCATION: CPT

## 2025-04-09 NOTE — PROGRESS NOTES
[facility] at the [address], [place] [address]  Phone: [phone]      Fax:  [phone]            Discharge Summary    Patient name: Александр Desir     Start of Care: 2025  Referral source: Joseph Rod MD    : 1940  Medical/Treatment Diagnosis: Left shoulder pain  Onset Date:11/15/2024  Prior Hospitalization: see medical history   Provider#: [id number]  Comorbidities: CHF/pacemaker, TIA, CKD, A-fib, tibial tendon repair (left), left meniscus repair, gall bladder removal, hearing aids, arthritis, neuropathy, SOB, dysphagia      Prior Level of Function: SPC, functionally independent, semi -active lifestyle       Visits from Start of Care: 7    Missed Visits: 0      If an interpreting service is utilized for treatment of this patient, the contents of this document represent the material reviewed with the patient via the .     Reporting Period : 2025 to 25    Goals/Measure of Progress:    Short Term Goals: STG- To be accomplished in 4 week(s):  1.  Pt will be compliant with HEP to encourage prophylaxis.   Eval:provided and reviewed HEP   Current: MET     2.  Pt will improve left shoulder PROM by 20 deg in each direction or WFL to increase mobility required for ADL/IADLs.  Eval:  PROM Left Right   Flexion 153 NT   Scaptin/ NT   ER @ 90 Degrees 90 NT   IR @ 90 Degrees 40 NT   *assessment done in a reclined position   Current (2025): left shoulder abduction PROM: 152 degrees, MET     Long Term Goals: LTG- To be accomplished in 8 week(s):  1.  Pt will report a worst left shoulder pain of a 1/10 to improve functional activity tolerance for house work and volunteering activities.  Eval:worst pain = 3-4/10      Discharge: MET    2.  Pt will improve left shoulder AROM by 20 deg in each direction or WFL to increase mobility required for ADL/IADLs.  Eval:  AROM Left Right   Flexion 150 150   Scaption/ABD

## 2025-04-09 NOTE — PROGRESS NOTES
PHYSICAL / OCCUPATIONAL THERAPY - DAILY TREATMENT NOTE     Patient Name: Александр Desir    Date: 2025    : 1940  Insurance: Payor: MEDICARE / Plan: MEDICARE PART A AND B / Product Type: *No Product type* /      Patient  verified Yes     Visit #   Current / Total 7 24   Time   In / Out 1040 1120   Pain   In / Out 1 1   Subjective Functional Status/Changes: Doing well, okay with discharge today.    Changes to:  Allergies, Med Hx, Sx Hx?   no       TREATMENT AREA =  Left shoulder pain [M25.512]    If an interpreting service is utilized for treatment of this patient, the contents of this document represent the material reviewed with the patient via the .     OBJECTIVE    Modalities Rationale:     decrease edema, decrease inflammation, and decrease pain to improve patient's ability to progress to PLOF and address remaining functional goals.     min [] Estim Unattended, type/location:                                      []  w/ice    []  w/heat    min [] Estim Attended, type/location:                                     []  w/US     []  w/ice    []  w/heat    []  TENS insruct      min []  Mechanical Traction: type/lbs                   []  pro   []  sup   []  int   []  cont    []  before manual    []  after manual    min []  Ultrasound, settings/location:      min []  Iontophoresis w/ dexamethasone, location:                                               []  take home patch       []  in clinic        min  unbilled []  Ice     []  Heat    location/position:     min []  Paraffin,  details:     min []  Vasopneumatic Device, press/temp:     min []  [employer]pool / Fluido:    If using vaso (only need to measure limb vaso being performed on)      pre-treatment girth :       post-treatment girth :       measured at (landmark location) :      min []  Other:    Skin assessment post-treatment (if applicable):    []  intact    []  redness- no adverse reaction                 []redness - adverse reaction:
